# Patient Record
Sex: FEMALE | Race: BLACK OR AFRICAN AMERICAN | NOT HISPANIC OR LATINO | Employment: FULL TIME | URBAN - METROPOLITAN AREA
[De-identification: names, ages, dates, MRNs, and addresses within clinical notes are randomized per-mention and may not be internally consistent; named-entity substitution may affect disease eponyms.]

---

## 2020-02-03 ENCOUNTER — OCCUPATIONAL HEALTH (OUTPATIENT)
Dept: URGENT CARE | Facility: CLINIC | Age: 25
End: 2020-02-03
Payer: COMMERCIAL

## 2020-02-03 DIAGNOSIS — Z02.89 ENCOUNTER FOR PHYSICAL EXAMINATION RELATED TO EMPLOYMENT: ICD-10-CM

## 2020-02-03 PROCEDURE — 80305 DRUG TEST PRSMV DIR OPT OBS: CPT | Mod: S$GLB,,, | Performed by: EMERGENCY MEDICINE

## 2020-02-03 PROCEDURE — 80305 PR COLLECTION ONLY DRUG SCREEN: ICD-10-PCS | Mod: S$GLB,,, | Performed by: EMERGENCY MEDICINE

## 2020-02-16 ENCOUNTER — HOSPITAL ENCOUNTER (EMERGENCY)
Facility: HOSPITAL | Age: 25
Discharge: HOME | End: 2020-02-16
Attending: EMERGENCY MEDICINE
Payer: COMMERCIAL

## 2020-02-16 VITALS
BODY MASS INDEX: 18.78 KG/M2 | DIASTOLIC BLOOD PRESSURE: 96 MMHG | TEMPERATURE: 99 F | HEART RATE: 98 BPM | HEIGHT: 64 IN | OXYGEN SATURATION: 99 % | RESPIRATION RATE: 20 BRPM | WEIGHT: 110 LBS | SYSTOLIC BLOOD PRESSURE: 132 MMHG

## 2020-02-16 DIAGNOSIS — R52 PAIN: ICD-10-CM

## 2020-02-16 DIAGNOSIS — S92.351A DISPLACED FRACTURE OF FIFTH METATARSAL BONE, RIGHT FOOT, INITIAL ENCOUNTER FOR CLOSED FRACTURE: Primary | ICD-10-CM

## 2020-02-16 LAB — B-HCG UR QL: NEGATIVE

## 2020-02-16 PROCEDURE — 73630 X-RAY EXAM OF FOOT: CPT | Mod: TC,FY,LT

## 2020-02-16 PROCEDURE — 63600175 PHARM REV CODE 636 W HCPCS: Performed by: PHYSICIAN ASSISTANT

## 2020-02-16 PROCEDURE — 73630 X-RAY EXAM OF FOOT: CPT | Mod: 26,LT,, | Performed by: RADIOLOGY

## 2020-02-16 PROCEDURE — 96372 THER/PROPH/DIAG INJ SC/IM: CPT

## 2020-02-16 PROCEDURE — 73630 XR FOOT COMPLETE 3 VIEW LEFT: ICD-10-PCS | Mod: 26,LT,, | Performed by: RADIOLOGY

## 2020-02-16 PROCEDURE — 99283 EMERGENCY DEPT VISIT LOW MDM: CPT | Mod: 25

## 2020-02-16 PROCEDURE — 81025 URINE PREGNANCY TEST: CPT

## 2020-02-16 PROCEDURE — 25000003 PHARM REV CODE 250: Performed by: PHYSICIAN ASSISTANT

## 2020-02-16 RX ORDER — ACETAMINOPHEN 500 MG
500 TABLET ORAL
Status: COMPLETED | OUTPATIENT
Start: 2020-02-16 | End: 2020-02-16

## 2020-02-16 RX ORDER — KETOROLAC TROMETHAMINE 30 MG/ML
15 INJECTION, SOLUTION INTRAMUSCULAR; INTRAVENOUS
Status: COMPLETED | OUTPATIENT
Start: 2020-02-16 | End: 2020-02-16

## 2020-02-16 RX ORDER — TRAMADOL HYDROCHLORIDE 50 MG/1
50 TABLET ORAL EVERY 6 HOURS PRN
Qty: 12 TABLET | Refills: 0 | Status: ON HOLD | OUTPATIENT
Start: 2020-02-16 | End: 2020-02-21 | Stop reason: HOSPADM

## 2020-02-16 RX ADMIN — KETOROLAC TROMETHAMINE 15 MG: 30 INJECTION, SOLUTION INTRAMUSCULAR; INTRAVENOUS at 08:02

## 2020-02-16 RX ADMIN — ACETAMINOPHEN 500 MG: 500 TABLET, FILM COATED ORAL at 08:02

## 2020-02-17 NOTE — ED PROVIDER NOTES
Encounter Date: 2/16/2020       History     Chief Complaint   Patient presents with    Fall     Patient fell coming down the stairs and injured her left foot.     Patient presents to the ER with complaint of left foot pain.  Patient states fell while going down stairs.  Patient states unable to bear weight secondary to pain in left foot.  Patient denies numbness or tingling to the distal extremity.  Patient denies prior injury to this foot describes the pain as severe sharp nonradiating nothing makes it better touching or moving it makes it worse denies having taking any medicine prior to arrival for pain.  Patient denies having icy injury prior to arrival.  Patient denied other associated injuries or symptoms.    The history is provided by the patient.     Review of patient's allergies indicates:  No Known Allergies  History reviewed. No pertinent past medical history.  History reviewed. No pertinent surgical history.  History reviewed. No pertinent family history.  Social History     Tobacco Use    Smoking status: Never Smoker   Substance Use Topics    Alcohol use: Never     Frequency: Never    Drug use: Never     Review of Systems   Constitutional: Negative for fever.   HENT: Negative for sore throat.    Respiratory: Negative for shortness of breath.    Cardiovascular: Negative for chest pain.   Gastrointestinal: Negative for abdominal pain, constipation, diarrhea, nausea and vomiting.   Genitourinary: Negative for dysuria.   Musculoskeletal: Positive for arthralgias (Left foot) and gait problem ( unable to bear weight due to pain on left foot). Negative for back pain, neck pain and neck stiffness.   Skin: Negative for rash.   Neurological: Negative for weakness.   Hematological: Does not bruise/bleed easily.   All other systems reviewed and are negative.      Physical Exam     Initial Vitals [02/16/20 2015]   BP Pulse Resp Temp SpO2   (!) 132/96 98 20 98.6 °F (37 °C) 99 %      MAP       --         Physical  Exam    Nursing note and vitals reviewed.  Constitutional: She appears well-developed and well-nourished. She is not diaphoretic. No distress.   HENT:   Head: Atraumatic.   Eyes: Right eye exhibits no discharge. Left eye exhibits no discharge.   Neck: Normal range of motion. Neck supple.   Cardiovascular: Normal rate, regular rhythm and intact distal pulses.   Pulmonary/Chest: No respiratory distress.   Musculoskeletal: Normal range of motion. She exhibits tenderness. She exhibits no edema.        Right ankle: Normal.        Left ankle: Normal.        Right foot: Normal.        Left foot: There is tenderness, bony tenderness, swelling and deformity. There is normal range of motion, normal capillary refill and no laceration.        Feet:    Neurological: She is alert and oriented to person, place, and time. No sensory deficit. GCS score is 15. GCS eye subscore is 4. GCS verbal subscore is 5. GCS motor subscore is 6.   Skin: Skin is warm and dry. Capillary refill takes less than 2 seconds.   Psychiatric: She has a normal mood and affect. Thought content normal.         ED Course   Procedures  Labs Reviewed   PREGNANCY TEST, URINE RAPID          Imaging Results          X-Ray Foot Complete Left (In process)               X-Rays:   Independently Interpreted Readings:   Other Readings:  Comminuted fracture to 5th metatarsal    Medical Decision Making:   Differential Diagnosis:   Sprain strain contusion fracture dislocation  Clinical Tests:   Radiological Study: Ordered and Reviewed  ED Management:  Discussed with the patient plan of care will order imaging and medicine for pain patient verbalize she understood she did not have any questions.    Discussed imaging results as well as need for follow-up with Podiatry discussed prescribed medication wrist benefit discussed over-the-counter treatment of pain with Tylenol ibuprofen as well as return to ER precautions discussed with the patient do not bear weight on left foot  until released by Podiatry the patient verbalize she understood she did not have any questions.    Splint applied by RN with direct supervision by me patient neurovascular intact post splint application.    This note was created using [employer] voice recognition software that occasionally misinterpreted phrases or words.                                 Clinical Impression:       ICD-10-CM ICD-9-CM   1. Displaced fracture of fifth metatarsal bone, right foot, initial encounter for closed fracture S92.351A 825.25   2. Pain R52 780.96                             REN Figueroa  02/16/20 2133

## 2020-02-17 NOTE — DISCHARGE INSTRUCTIONS
DO NOT BEAR WEIGHT ON LEFT FOOT UNTIL CLEARED BY PODIATRIST.    Your blood pressure was elevated on exam today please follow up with pcp for blood pressure management.     Follow-up with Dr. Nobles at next available appointment.    If acute worsening of symptoms return to ER for re-evaluation.    May take over-the-counter Tylenol and/or ibuprofen with prescribed medicine.  Use prescribed medicine for breakthrough pain only.

## 2020-02-19 ENCOUNTER — LAB VISIT (OUTPATIENT)
Dept: LAB | Facility: HOSPITAL | Age: 25
End: 2020-02-19
Attending: PODIATRIST
Payer: COMMERCIAL

## 2020-02-19 ENCOUNTER — OFFICE VISIT (OUTPATIENT)
Dept: PODIATRY | Facility: CLINIC | Age: 25
End: 2020-02-19
Payer: COMMERCIAL

## 2020-02-19 VITALS
HEART RATE: 86 BPM | WEIGHT: 110 LBS | SYSTOLIC BLOOD PRESSURE: 124 MMHG | RESPIRATION RATE: 18 BRPM | DIASTOLIC BLOOD PRESSURE: 79 MMHG | HEIGHT: 64 IN | BODY MASS INDEX: 18.78 KG/M2 | TEMPERATURE: 98 F

## 2020-02-19 DIAGNOSIS — Z01.818 PRE-OP EXAM: ICD-10-CM

## 2020-02-19 DIAGNOSIS — S92.352A CLOSED DISPLACED FRACTURE OF FIFTH METATARSAL BONE OF LEFT FOOT, INITIAL ENCOUNTER: Primary | ICD-10-CM

## 2020-02-19 DIAGNOSIS — S92.351A DISPLACED FRACTURE OF FIFTH METATARSAL BONE, RIGHT FOOT, INITIAL ENCOUNTER FOR CLOSED FRACTURE: ICD-10-CM

## 2020-02-19 LAB
ALBUMIN SERPL BCP-MCNC: 4 G/DL (ref 3.5–5.2)
ALP SERPL-CCNC: 41 U/L (ref 55–135)
ALT SERPL W/O P-5'-P-CCNC: 42 U/L (ref 10–44)
ANION GAP SERPL CALC-SCNC: 8 MMOL/L (ref 8–16)
AST SERPL-CCNC: 31 U/L (ref 10–40)
BASOPHILS # BLD AUTO: 0.06 K/UL (ref 0–0.2)
BASOPHILS NFR BLD: 1.3 % (ref 0–1.9)
BILIRUB SERPL-MCNC: 0.8 MG/DL (ref 0.1–1)
BUN SERPL-MCNC: 8 MG/DL (ref 6–20)
CALCIUM SERPL-MCNC: 8.7 MG/DL (ref 8.7–10.5)
CHLORIDE SERPL-SCNC: 107 MMOL/L (ref 95–110)
CO2 SERPL-SCNC: 25 MMOL/L (ref 23–29)
CREAT SERPL-MCNC: 0.8 MG/DL (ref 0.5–1.4)
DIFFERENTIAL METHOD: NORMAL
EOSINOPHIL # BLD AUTO: 0.3 K/UL (ref 0–0.5)
EOSINOPHIL NFR BLD: 7 % (ref 0–8)
ERYTHROCYTE [DISTWIDTH] IN BLOOD BY AUTOMATED COUNT: 11.9 % (ref 11.5–14.5)
EST. GFR  (AFRICAN AMERICAN): >60 ML/MIN/1.73 M^2
EST. GFR  (NON AFRICAN AMERICAN): >60 ML/MIN/1.73 M^2
GLUCOSE SERPL-MCNC: 91 MG/DL (ref 70–110)
HCT VFR BLD AUTO: 40.9 % (ref 37–48.5)
HGB BLD-MCNC: 13.4 G/DL (ref 12–16)
IMM GRANULOCYTES # BLD AUTO: 0.02 K/UL (ref 0–0.04)
IMM GRANULOCYTES NFR BLD AUTO: 0.4 % (ref 0–0.5)
LYMPHOCYTES # BLD AUTO: 1.7 K/UL (ref 1–4.8)
LYMPHOCYTES NFR BLD: 36.4 % (ref 18–48)
MCH RBC QN AUTO: 28.2 PG (ref 27–31)
MCHC RBC AUTO-ENTMCNC: 32.8 G/DL (ref 32–36)
MCV RBC AUTO: 86 FL (ref 82–98)
MONOCYTES # BLD AUTO: 0.4 K/UL (ref 0.3–1)
MONOCYTES NFR BLD: 8.7 % (ref 4–15)
NEUTROPHILS # BLD AUTO: 2.2 K/UL (ref 1.8–7.7)
NEUTROPHILS NFR BLD: 46.2 % (ref 38–73)
NRBC BLD-RTO: 0 /100 WBC
PLATELET # BLD AUTO: 270 K/UL (ref 150–350)
PMV BLD AUTO: 10.6 FL (ref 9.2–12.9)
POTASSIUM SERPL-SCNC: 3.9 MMOL/L (ref 3.5–5.1)
PROT SERPL-MCNC: 6.7 G/DL (ref 6–8.4)
RBC # BLD AUTO: 4.76 M/UL (ref 4–5.4)
SODIUM SERPL-SCNC: 140 MMOL/L (ref 136–145)
WBC # BLD AUTO: 4.73 K/UL (ref 3.9–12.7)

## 2020-02-19 PROCEDURE — 3008F BODY MASS INDEX DOCD: CPT | Mod: CPTII,S$GLB,, | Performed by: PODIATRIST

## 2020-02-19 PROCEDURE — 85025 COMPLETE CBC W/AUTO DIFF WBC: CPT

## 2020-02-19 PROCEDURE — 36415 COLL VENOUS BLD VENIPUNCTURE: CPT

## 2020-02-19 PROCEDURE — 99205 OFFICE O/P NEW HI 60 MIN: CPT | Mod: 57,S$GLB,, | Performed by: PODIATRIST

## 2020-02-19 PROCEDURE — 3008F PR BODY MASS INDEX (BMI) DOCUMENTED: ICD-10-PCS | Mod: CPTII,S$GLB,, | Performed by: PODIATRIST

## 2020-02-19 PROCEDURE — 99999 PR PBB SHADOW E&M-EST. PATIENT-LVL V: CPT | Mod: PBBFAC,,, | Performed by: PODIATRIST

## 2020-02-19 PROCEDURE — 99999 PR PBB SHADOW E&M-EST. PATIENT-LVL V: ICD-10-PCS | Mod: PBBFAC,,, | Performed by: PODIATRIST

## 2020-02-19 PROCEDURE — 80053 COMPREHEN METABOLIC PANEL: CPT

## 2020-02-19 PROCEDURE — 99205 PR OFFICE/OUTPT VISIT, NEW, LEVL V, 60-74 MIN: ICD-10-PCS | Mod: 57,S$GLB,, | Performed by: PODIATRIST

## 2020-02-19 RX ORDER — SODIUM CHLORIDE, SODIUM LACTATE, POTASSIUM CHLORIDE, CALCIUM CHLORIDE 600; 310; 30; 20 MG/100ML; MG/100ML; MG/100ML; MG/100ML
INJECTION, SOLUTION INTRAVENOUS CONTINUOUS
Status: CANCELLED | OUTPATIENT
Start: 2020-02-21

## 2020-02-19 RX ORDER — OXYCODONE AND ACETAMINOPHEN 5; 325 MG/1; MG/1
2 TABLET ORAL
Qty: 84 TABLET | Refills: 0 | Status: ON HOLD | OUTPATIENT
Start: 2020-02-19 | End: 2020-02-21 | Stop reason: SDUPTHER

## 2020-02-19 RX ORDER — FLUCONAZOLE 200 MG/1
200 TABLET ORAL
Qty: 4 TABLET | Refills: 1 | Status: ON HOLD | OUTPATIENT
Start: 2020-02-19 | End: 2020-02-21

## 2020-02-19 RX ORDER — METRONIDAZOLE 500 MG/1
TABLET ORAL
Status: ON HOLD | COMMUNITY
Start: 2020-02-07 | End: 2020-02-21

## 2020-02-19 RX ORDER — SULFAMETHOXAZOLE AND TRIMETHOPRIM 400; 80 MG/1; MG/1
2 TABLET ORAL 2 TIMES DAILY
Qty: 56 TABLET | Refills: 0 | Status: SHIPPED | OUTPATIENT
Start: 2020-02-19 | End: 2020-03-04

## 2020-02-19 RX ORDER — ONDANSETRON HYDROCHLORIDE 8 MG/1
8 TABLET, FILM COATED ORAL EVERY 8 HOURS PRN
Qty: 42 TABLET | Refills: 1 | Status: SHIPPED | OUTPATIENT
Start: 2020-02-19 | End: 2020-03-04

## 2020-02-19 NOTE — H&P (VIEW-ONLY)
Subjective:       Patient ID: Yaritza Lopez is a 24 y.o. female.    Chief Complaint: Foot Injury    Patient presents today for new patient evaluation she states that this past Sunday she fell down the stairs injuring the outside of her left foot she subsequently was seen in the emergency room and told that she had a fracture and was referred to me for evaluation and treatment.  Patient states she has not put any weight at all on her left foot she has worn a fracture boot and used crutches she states she cannot put any weight on the foot because of the discomfort she is having.  History reviewed. No pertinent past medical history.  History reviewed. No pertinent surgical history.  History reviewed. No pertinent family history.  Social History     Socioeconomic History    Marital status: Single     Spouse name: Not on file    Number of children: Not on file    Years of education: Not on file    Highest education level: Not on file   Occupational History    Not on file   Social Needs    Financial resource strain: Not on file    Food insecurity:     Worry: Not on file     Inability: Not on file    Transportation needs:     Medical: Not on file     Non-medical: Not on file   Tobacco Use    Smoking status: Never Smoker   Substance and Sexual Activity    Alcohol use: Never     Frequency: Never    Drug use: Never    Sexual activity: Not on file   Lifestyle    Physical activity:     Days per week: Not on file     Minutes per session: Not on file    Stress: Not on file   Relationships    Social connections:     Talks on phone: Not on file     Gets together: Not on file     Attends [employer] service: Not on file     Active member of club or organization: Not on file     Attends meetings of clubs or organizations: Not on file     Relationship status: Not on file   Other Topics Concern    Not on file   Social History Narrative    Not on file       Current Outpatient Medications   Medication Sig Dispense  "Refill    fluconazole (DIFLUCAN) 200 MG Tab Take 1 tablet (200 mg total) by mouth every 72 hours. for 14 days 4 tablet 1    metroNIDAZOLE (FLAGYL) 500 MG tablet       ondansetron (ZOFRAN) 8 MG tablet Take 1 tablet (8 mg total) by mouth every 8 (eight) hours as needed for Nausea. 42 tablet 1    oxyCODONE-acetaminophen (PERCOCET) 5-325 mg per tablet Take 2 tablets by mouth 6 (six) times daily. for 7 days 84 tablet 0    sulfamethoxazole-trimethoprim 400-80mg (BACTRIM,SEPTRA) 400-80 mg per tablet Take 2 tablets by mouth 2 (two) times daily. for 14 days 56 tablet 0    traMADol (ULTRAM) 50 mg tablet Take 1 tablet (50 mg total) by mouth every 6 (six) hours as needed. 12 tablet 0     No current facility-administered medications for this visit.      Review of patient's allergies indicates:  No Known Allergies    Review of Systems   Musculoskeletal: Positive for gait problem and joint swelling.   Skin: Positive for color change.   All other systems reviewed and are negative.      Objective:      Vitals:    02/19/20 0808   BP: 124/79   BP Location: Right arm   Patient Position: Sitting   Pulse: 86   Resp: 18   Temp: 98.2 °F (36.8 °C)   TempSrc: Oral   Weight: 49.9 kg (110 lb)   Height: 5' 4" (1.626 m)     Physical Exam   Constitutional: She appears well-developed and well-nourished.   HENT:   Head: Normocephalic.   Cardiovascular: Normal rate, regular rhythm and normal heart sounds.   Pulses:       Dorsalis pedis pulses are 2+ on the right side, and 2+ on the left side.        Posterior tibial pulses are 2+ on the right side, and 2+ on the left side.   Pulmonary/Chest: Effort normal and breath sounds normal.   Musculoskeletal: She exhibits tenderness and deformity.        Left ankle: She exhibits decreased range of motion, swelling, ecchymosis and deformity.        Left foot: There is decreased range of motion.        Feet:    Feet:   Right Foot:   Protective Sensation: 4 sites tested. 4 sites sensed.   Left Foot: "   Protective Sensation: 4 sites tested. 4 sites sensed.   Skin Integrity: Positive for erythema and warmth.   Neurological: She is alert.   Skin: Skin is warm. Capillary refill takes less than 2 seconds. There is erythema.   Psychiatric: She has a normal mood and affect. Her speech is normal and behavior is normal. Judgment and thought content normal. Cognition and memory are normal.   Nursing note and vitals reviewed.          Comprehensive metabolic panel   Order: [id number]   Status:  Final result   Visible to patient:  No (Not Released) Next appt:  02/24/2020 at 08:45 AM in Podiatry (Tim Nobles DPM) Dx:  Pre-op exam; Displaced fracture of fi...    Ref Range & Units 09:09   Sodium 136 - 145 mmol/L 140    Potassium 3.5 - 5.1 mmol/L 3.9    Chloride 95 - 110 mmol/L 107    CO2 23 - 29 mmol/L 25    Glucose 70 - 110 mg/dL 91    BUN, Bld 6 - 20 mg/dL 8    Creatinine 0.5 - 1.4 mg/dL 0.8    Calcium 8.7 - 10.5 mg/dL 8.7    Total Protein 6.0 - 8.4 g/dL 6.7    Albumin 3.5 - 5.2 g/dL 4.0    Total Bilirubin 0.1 - 1.0 mg/dL 0.8    Comment: For infants and newborns, interpretation of results should be based   on gestational age, weight and in agreement with clinical   observations.   Premature Infant recommended reference ranges:   Up to 24 hours.............<8.0 mg/dL   Up to 48 hours............<12.0 mg/dL   3-5 days..................<15.0 mg/dL   6-29 days.................<15.0 mg/dL    Alkaline Phosphatase 55 - 135 U/L 41Low     AST 10 - 40 U/L 31    ALT 10 - 44 U/L 42    Anion Gap 8 - 16 mmol/L 8    eGFR if African American >60 mL/min/1.73 m^2 >60.0    eGFR if non African American >60 mL/min/1.73 m^2 >60.0    Comment: Calculation used to obtain the estimated glomerular filtration   rate (eGFR) is the CKD-EPI equation.    [facility]         Specimen Collected: 02/19/20 09:09 Last Resulted: 02/19/20 09:59           CBC auto differential   Order: [id number]   Status:  Final result   Visible to patient:   No (Not Released)   Next appt:  02/24/2020 at 08:45 AM in Podiatry (Tim Nobles DPM)   Dx:  Pre-op exam; Displaced fracture of fi...    Ref Range & Units 09:09   WBC 3.90 - 12.70 K/uL 4.73    RBC 4.00 - 5.40 M/uL 4.76    Hemoglobin 12.0 - 16.0 g/dL 13.4    Hematocrit 37.0 - 48.5 % 40.9    Mean Corpuscular Volume 82 - 98 fL 86    Mean Corpuscular Hemoglobin 27.0 - 31.0 pg 28.2    Mean Corpuscular Hemoglobin Conc 32.0 - 36.0 g/dL 32.8    RDW 11.5 - 14.5 % 11.9    Platelets 150 - 350 K/uL 270    MPV 9.2 - 12.9 fL 10.6    Immature Granulocytes 0.0 - 0.5 % 0.4    Gran # (ANC) 1.8 - 7.7 K/uL 2.2    Immature Grans (Abs) 0.00 - 0.04 K/uL 0.02    Comment: Mild elevation in immature granulocytes is non specific and   can be seen in a variety of conditions including stress response,   acute inflammation, trauma and pregnancy. Correlation with other   laboratory and clinical findings is essential.    Lymph # 1.0 - 4.8 K/uL 1.7    Mono # 0.3 - 1.0 K/uL 0.4    Eos # 0.0 - 0.5 K/uL 0.3    Baso # 0.00 - 0.20 K/uL 0.06    nRBC 0 /100 WBC 0    Gran% 38.0 - 73.0 % 46.2    Lymph% 18.0 - 48.0 % 36.4    Mono% 4.0 - 15.0 % 8.7    Eosinophil% 0.0 - 8.0 % 7.0    Basophil% 0.0 - 1.9 % 1.3    Differential Method  Automated    [facility]         Specimen Collected: 02/19/20 09:09 Last Resulted: 02/19/20 09:42                Assessment:       1. Closed displaced fracture of fifth metatarsal bone of left foot, initial encounter    2. Pre-op exam        Plan:       Patient presents today for new patient evaluation she states that this past Sunday she fell down the stairs injuring the outside of her left foot she subsequently was seen in the emergency room and told that she had a fracture and was referred to me for evaluation and treatment.  Patient states she has not put any weight at all on her left foot she has worn a fracture boot and used crutches she states she cannot put any weight on the foot because of the  discomfort she is having.  On evaluation patient has significant erythema edema with ecchymosis encompassing the dorsal and lateral aspects of the patient's left foot there is significant tenderness with associated edema present secondary to a mid shaft fracture just proximal to the neck of the 5th metatarsal left foot there is displacement noted with gapping noted at the fracture site.  I did review the x-rays with the patient in detail and at length I have advised the patient I am recommending surgical repair I have advised her there is a possibility without repair that this may not heal or if it does heal it will heal in a malaligned position causing her long-term problems she was in understanding and agreement with this today and agreed to surgical intervention as discussed.  Patient presents today for preoperative history and physical in discussion all aspects of surgery were discussed with the patient no guarantees were given written or implied all potential complications including but not limited to delayed healing nonhealing postoperative pain infection recurrence were discussed with the patient in detail. All aspect of the patient's recovery time involved in recovery patient responsibility is involving recovery were also discussed in detail. Patient advised failure to comply with postoperative care will jeopardize surgical outcome.  All aspects of surgical intervention were discussed at length and in detail patient understands she is going to be completely nonweightbearing for the next 4 weeks followed by several weeks of partial weight-bearing in a fracture boot before gradual return to activity.  I did discuss fixation as necessary screws plates what ever needs to be utilized properly fixated and correct the fracture site patient was in understanding and agreement with this.  A complete new patient evaluation was performed as was surgical consultation patient may decision for surgery today separate  evaluation and management performed prescriptions were dispensed for Percocet Zofran Bactrim and Diflucan because the patient does have a history of yeast infections with oral antibiotic use.  Preoperative lab work has been ordered and already evaluated I did dispense the patient a prescription for a knee walker to help maintain her nonweightbearing status she can use her crutches as necessary and I gave her a prescription for a new fracture boot the 1 she is currently wearing is not going to be protective enough following surgery.  Patient will be NPO after midnight on February 20th patient's surgery is scheduled for February 21st she has been advised to contact us with any problems questions or concerns prior to surgery I have advised the patient she needs to ice and elevate this as much as possible to minimize the swelling prior to surgery certainly she will maintain complete not when nonweightbearing status.  Total face-to-face time including discussion evaluation new patient exam review of x-rays discussion of surgery and decision for surgery today equaled 60 min.This note was created using [employer] voice recognition software that occasionally misinterpreted phrases or words.

## 2020-02-19 NOTE — PROGRESS NOTES
Subjective:       Patient ID: Yaritza Lopez is a 24 y.o. female.    Chief Complaint: Foot Injury    Patient presents today for new patient evaluation she states that this past Sunday she fell down the stairs injuring the outside of her left foot she subsequently was seen in the emergency room and told that she had a fracture and was referred to me for evaluation and treatment.  Patient states she has not put any weight at all on her left foot she has worn a fracture boot and used crutches she states she cannot put any weight on the foot because of the discomfort she is having.  History reviewed. No pertinent past medical history.  History reviewed. No pertinent surgical history.  History reviewed. No pertinent family history.  Social History     Socioeconomic History    Marital status: Single     Spouse name: Not on file    Number of children: Not on file    Years of education: Not on file    Highest education level: Not on file   Occupational History    Not on file   Social Needs    Financial resource strain: Not on file    Food insecurity:     Worry: Not on file     Inability: Not on file    Transportation needs:     Medical: Not on file     Non-medical: Not on file   Tobacco Use    Smoking status: Never Smoker   Substance and Sexual Activity    Alcohol use: Never     Frequency: Never    Drug use: Never    Sexual activity: Not on file   Lifestyle    Physical activity:     Days per week: Not on file     Minutes per session: Not on file    Stress: Not on file   Relationships    Social connections:     Talks on phone: Not on file     Gets together: Not on file     Attends Jewish service: Not on file     Active member of club or organization: Not on file     Attends meetings of clubs or organizations: Not on file     Relationship status: Not on file   Other Topics Concern    Not on file   Social History Narrative    Not on file       Current Outpatient Medications   Medication Sig Dispense  "Refill    fluconazole (DIFLUCAN) 200 MG Tab Take 1 tablet (200 mg total) by mouth every 72 hours. for 14 days 4 tablet 1    metroNIDAZOLE (FLAGYL) 500 MG tablet       ondansetron (ZOFRAN) 8 MG tablet Take 1 tablet (8 mg total) by mouth every 8 (eight) hours as needed for Nausea. 42 tablet 1    oxyCODONE-acetaminophen (PERCOCET) 5-325 mg per tablet Take 2 tablets by mouth 6 (six) times daily. for 7 days 84 tablet 0    sulfamethoxazole-trimethoprim 400-80mg (BACTRIM,SEPTRA) 400-80 mg per tablet Take 2 tablets by mouth 2 (two) times daily. for 14 days 56 tablet 0    traMADol (ULTRAM) 50 mg tablet Take 1 tablet (50 mg total) by mouth every 6 (six) hours as needed. 12 tablet 0     No current facility-administered medications for this visit.      Review of patient's allergies indicates:  No Known Allergies    Review of Systems   Musculoskeletal: Positive for gait problem and joint swelling.   Skin: Positive for color change.   All other systems reviewed and are negative.      Objective:      Vitals:    02/19/20 0808   BP: 124/79   BP Location: Right arm   Patient Position: Sitting   Pulse: 86   Resp: 18   Temp: 98.2 °F (36.8 °C)   TempSrc: Oral   Weight: 49.9 kg (110 lb)   Height: 5' 4" (1.626 m)     Physical Exam   Constitutional: She appears well-developed and well-nourished.   HENT:   Head: Normocephalic.   Cardiovascular: Normal rate, regular rhythm and normal heart sounds.   Pulses:       Dorsalis pedis pulses are 2+ on the right side, and 2+ on the left side.        Posterior tibial pulses are 2+ on the right side, and 2+ on the left side.   Pulmonary/Chest: Effort normal and breath sounds normal.   Musculoskeletal: She exhibits tenderness and deformity.        Left ankle: She exhibits decreased range of motion, swelling, ecchymosis and deformity.        Left foot: There is decreased range of motion.        Feet:    Feet:   Right Foot:   Protective Sensation: 4 sites tested. 4 sites sensed.   Left Foot: "   Protective Sensation: 4 sites tested. 4 sites sensed.   Skin Integrity: Positive for erythema and warmth.   Neurological: She is alert.   Skin: Skin is warm. Capillary refill takes less than 2 seconds. There is erythema.   Psychiatric: She has a normal mood and affect. Her speech is normal and behavior is normal. Judgment and thought content normal. Cognition and memory are normal.   Nursing note and vitals reviewed.          Comprehensive metabolic panel   Order: [id number]   Status:  Final result   Visible to patient:  No (Not Released) Next appt:  02/24/2020 at 08:45 AM in Podiatry (Tim Nobles DPM) Dx:  Pre-op exam; Displaced fracture of fi...    Ref Range & Units 09:09   Sodium 136 - 145 mmol/L 140    Potassium 3.5 - 5.1 mmol/L 3.9    Chloride 95 - 110 mmol/L 107    CO2 23 - 29 mmol/L 25    Glucose 70 - 110 mg/dL 91    BUN, Bld 6 - 20 mg/dL 8    Creatinine 0.5 - 1.4 mg/dL 0.8    Calcium 8.7 - 10.5 mg/dL 8.7    Total Protein 6.0 - 8.4 g/dL 6.7    Albumin 3.5 - 5.2 g/dL 4.0    Total Bilirubin 0.1 - 1.0 mg/dL 0.8    Comment: For infants and newborns, interpretation of results should be based   on gestational age, weight and in agreement with clinical   observations.   Premature Infant recommended reference ranges:   Up to 24 hours.............<8.0 mg/dL   Up to 48 hours............<12.0 mg/dL   3-5 days..................<15.0 mg/dL   6-29 days.................<15.0 mg/dL    Alkaline Phosphatase 55 - 135 U/L 41Low     AST 10 - 40 U/L 31    ALT 10 - 44 U/L 42    Anion Gap 8 - 16 mmol/L 8    eGFR if African American >60 mL/min/1.73 m^2 >60.0    eGFR if non African American >60 mL/min/1.73 m^2 >60.0    Comment: Calculation used to obtain the estimated glomerular filtration   rate (eGFR) is the CKD-EPI equation.    [facility]         Specimen Collected: 02/19/20 09:09 Last Resulted: 02/19/20 09:59           CBC auto differential   Order: [id number]   Status:  Final result   Visible to patient:   No (Not Released)   Next appt:  02/24/2020 at 08:45 AM in Podiatry (Tim Nobles DPM)   Dx:  Pre-op exam; Displaced fracture of fi...    Ref Range & Units 09:09   WBC 3.90 - 12.70 K/uL 4.73    RBC 4.00 - 5.40 M/uL 4.76    Hemoglobin 12.0 - 16.0 g/dL 13.4    Hematocrit 37.0 - 48.5 % 40.9    Mean Corpuscular Volume 82 - 98 fL 86    Mean Corpuscular Hemoglobin 27.0 - 31.0 pg 28.2    Mean Corpuscular Hemoglobin Conc 32.0 - 36.0 g/dL 32.8    RDW 11.5 - 14.5 % 11.9    Platelets 150 - 350 K/uL 270    MPV 9.2 - 12.9 fL 10.6    Immature Granulocytes 0.0 - 0.5 % 0.4    Gran # (ANC) 1.8 - 7.7 K/uL 2.2    Immature Grans (Abs) 0.00 - 0.04 K/uL 0.02    Comment: Mild elevation in immature granulocytes is non specific and   can be seen in a variety of conditions including stress response,   acute inflammation, trauma and pregnancy. Correlation with other   laboratory and clinical findings is essential.    Lymph # 1.0 - 4.8 K/uL 1.7    Mono # 0.3 - 1.0 K/uL 0.4    Eos # 0.0 - 0.5 K/uL 0.3    Baso # 0.00 - 0.20 K/uL 0.06    nRBC 0 /100 WBC 0    Gran% 38.0 - 73.0 % 46.2    Lymph% 18.0 - 48.0 % 36.4    Mono% 4.0 - 15.0 % 8.7    Eosinophil% 0.0 - 8.0 % 7.0    Basophil% 0.0 - 1.9 % 1.3    Differential Method  Automated    [facility]         Specimen Collected: 02/19/20 09:09 Last Resulted: 02/19/20 09:42                Assessment:       1. Closed displaced fracture of fifth metatarsal bone of left foot, initial encounter    2. Pre-op exam        Plan:       Patient presents today for new patient evaluation she states that this past Sunday she fell down the stairs injuring the outside of her left foot she subsequently was seen in the emergency room and told that she had a fracture and was referred to me for evaluation and treatment.  Patient states she has not put any weight at all on her left foot she has worn a fracture boot and used crutches she states she cannot put any weight on the foot because of the  discomfort she is having.  On evaluation patient has significant erythema edema with ecchymosis encompassing the dorsal and lateral aspects of the patient's left foot there is significant tenderness with associated edema present secondary to a mid shaft fracture just proximal to the neck of the 5th metatarsal left foot there is displacement noted with gapping noted at the fracture site.  I did review the x-rays with the patient in detail and at length I have advised the patient I am recommending surgical repair I have advised her there is a possibility without repair that this may not heal or if it does heal it will heal in a malaligned position causing her long-term problems she was in understanding and agreement with this today and agreed to surgical intervention as discussed.  Patient presents today for preoperative history and physical in discussion all aspects of surgery were discussed with the patient no guarantees were given written or implied all potential complications including but not limited to delayed healing nonhealing postoperative pain infection recurrence were discussed with the patient in detail. All aspect of the patient's recovery time involved in recovery patient responsibility is involving recovery were also discussed in detail. Patient advised failure to comply with postoperative care will jeopardize surgical outcome.  All aspects of surgical intervention were discussed at length and in detail patient understands she is going to be completely nonweightbearing for the next 4 weeks followed by several weeks of partial weight-bearing in a fracture boot before gradual return to activity.  I did discuss fixation as necessary screws plates what ever needs to be utilized properly fixated and correct the fracture site patient was in understanding and agreement with this.  A complete new patient evaluation was performed as was surgical consultation patient may decision for surgery today separate  evaluation and management performed prescriptions were dispensed for Percocet Zofran Bactrim and Diflucan because the patient does have a history of yeast infections with oral antibiotic use.  Preoperative lab work has been ordered and already evaluated I did dispense the patient a prescription for a knee walker to help maintain her nonweightbearing status she can use her crutches as necessary and I gave her a prescription for a new fracture boot the 1 she is currently wearing is not going to be protective enough following surgery.  Patient will be NPO after midnight on February 20th patient's surgery is scheduled for February 21st she has been advised to contact us with any problems questions or concerns prior to surgery I have advised the patient she needs to ice and elevate this as much as possible to minimize the swelling prior to surgery certainly she will maintain complete not when nonweightbearing status.  Total face-to-face time including discussion evaluation new patient exam review of x-rays discussion of surgery and decision for surgery today equaled 60 min.This note was created using [employer] voice recognition software that occasionally misinterpreted phrases or words.

## 2020-02-21 ENCOUNTER — TELEPHONE (OUTPATIENT)
Dept: PODIATRY | Facility: CLINIC | Age: 25
End: 2020-02-21

## 2020-02-21 ENCOUNTER — ANESTHESIA (OUTPATIENT)
Dept: SURGERY | Facility: HOSPITAL | Age: 25
End: 2020-02-21
Payer: COMMERCIAL

## 2020-02-21 ENCOUNTER — HOSPITAL ENCOUNTER (OUTPATIENT)
Facility: HOSPITAL | Age: 25
Discharge: HOME | End: 2020-02-21
Attending: PODIATRIST | Admitting: PODIATRIST
Payer: COMMERCIAL

## 2020-02-21 ENCOUNTER — ANESTHESIA EVENT (OUTPATIENT)
Dept: SURGERY | Facility: HOSPITAL | Age: 25
End: 2020-02-21
Payer: COMMERCIAL

## 2020-02-21 DIAGNOSIS — S92.351A DISPLACED FRACTURE OF FIFTH METATARSAL BONE, RIGHT FOOT, INITIAL ENCOUNTER FOR CLOSED FRACTURE: Primary | ICD-10-CM

## 2020-02-21 DIAGNOSIS — Z01.818 PRE-OP EXAM: ICD-10-CM

## 2020-02-21 DIAGNOSIS — S92.352A CLOSED DISPLACED FRACTURE OF FIFTH METATARSAL BONE OF LEFT FOOT, INITIAL ENCOUNTER: ICD-10-CM

## 2020-02-21 LAB — B-HCG UR QL: NEGATIVE

## 2020-02-21 PROCEDURE — S0020 INJECTION, BUPIVICAINE HYDRO: HCPCS | Performed by: PODIATRIST

## 2020-02-21 PROCEDURE — 63600175 PHARM REV CODE 636 W HCPCS: Performed by: PODIATRIST

## 2020-02-21 PROCEDURE — D9220A PRA ANESTHESIA: Mod: CRNA,,, | Performed by: NURSE ANESTHETIST, CERTIFIED REGISTERED

## 2020-02-21 PROCEDURE — 36000709 HC OR TIME LEV III EA ADD 15 MIN: Performed by: PODIATRIST

## 2020-02-21 PROCEDURE — 28485 PR OPEN TREATMENT METATARSAL FRACTURE EACH: ICD-10-PCS | Mod: T4,,, | Performed by: PODIATRIST

## 2020-02-21 PROCEDURE — 63600175 PHARM REV CODE 636 W HCPCS

## 2020-02-21 PROCEDURE — 25000003 PHARM REV CODE 250: Performed by: PODIATRIST

## 2020-02-21 PROCEDURE — 28485 OPTX METATARSAL FX EACH: CPT | Mod: T4,,, | Performed by: PODIATRIST

## 2020-02-21 PROCEDURE — 63600175 PHARM REV CODE 636 W HCPCS: Performed by: NURSE ANESTHETIST, CERTIFIED REGISTERED

## 2020-02-21 PROCEDURE — 71000015 HC POSTOP RECOV 1ST HR: Performed by: PODIATRIST

## 2020-02-21 PROCEDURE — 36000708 HC OR TIME LEV III 1ST 15 MIN: Performed by: PODIATRIST

## 2020-02-21 PROCEDURE — D9220A PRA ANESTHESIA: ICD-10-PCS | Mod: CRNA,,, | Performed by: NURSE ANESTHETIST, CERTIFIED REGISTERED

## 2020-02-21 PROCEDURE — 71000033 HC RECOVERY, INTIAL HOUR: Performed by: PODIATRIST

## 2020-02-21 PROCEDURE — 37000008 HC ANESTHESIA 1ST 15 MINUTES: Performed by: PODIATRIST

## 2020-02-21 PROCEDURE — D9220A PRA ANESTHESIA: ICD-10-PCS | Mod: ANES,,, | Performed by: ANESTHESIOLOGY

## 2020-02-21 PROCEDURE — C1713 ANCHOR/SCREW BN/BN,TIS/BN: HCPCS | Performed by: PODIATRIST

## 2020-02-21 PROCEDURE — 37000009 HC ANESTHESIA EA ADD 15 MINS: Performed by: PODIATRIST

## 2020-02-21 PROCEDURE — C1769 GUIDE WIRE: HCPCS | Performed by: PODIATRIST

## 2020-02-21 PROCEDURE — 81025 URINE PREGNANCY TEST: CPT

## 2020-02-21 PROCEDURE — D9220A PRA ANESTHESIA: Mod: ANES,,, | Performed by: ANESTHESIOLOGY

## 2020-02-21 DEVICE — IMPLANTABLE DEVICE: Type: IMPLANTABLE DEVICE | Site: FOOT | Status: FUNCTIONAL

## 2020-02-21 RX ORDER — MIDAZOLAM HYDROCHLORIDE 1 MG/ML
2 INJECTION INTRAMUSCULAR; INTRAVENOUS ONCE
Status: DISCONTINUED | OUTPATIENT
Start: 2020-02-21 | End: 2020-02-21 | Stop reason: SDUPTHER

## 2020-02-21 RX ORDER — LIDOCAINE HYDROCHLORIDE 10 MG/ML
INJECTION, SOLUTION EPIDURAL; INFILTRATION; INTRACAUDAL; PERINEURAL
Status: DISCONTINUED
Start: 2020-02-21 | End: 2020-02-21 | Stop reason: HOSPADM

## 2020-02-21 RX ORDER — MIDAZOLAM HYDROCHLORIDE 1 MG/ML
INJECTION INTRAMUSCULAR; INTRAVENOUS
Status: COMPLETED
Start: 2020-02-21 | End: 2020-02-21

## 2020-02-21 RX ORDER — LIDOCAINE HYDROCHLORIDE 10 MG/ML
1 INJECTION, SOLUTION EPIDURAL; INFILTRATION; INTRACAUDAL; PERINEURAL ONCE
Status: CANCELLED | OUTPATIENT
Start: 2020-02-21 | End: 2020-02-21

## 2020-02-21 RX ORDER — CEFAZOLIN SODIUM 1 G/50ML
1 SOLUTION INTRAVENOUS
Status: COMPLETED | OUTPATIENT
Start: 2020-02-21 | End: 2020-02-21

## 2020-02-21 RX ORDER — OXYCODONE AND ACETAMINOPHEN 5; 325 MG/1; MG/1
1 TABLET ORAL
Status: DISCONTINUED | OUTPATIENT
Start: 2020-02-21 | End: 2020-02-21 | Stop reason: HOSPADM

## 2020-02-21 RX ORDER — BUPIVACAINE HYDROCHLORIDE 5 MG/ML
INJECTION, SOLUTION EPIDURAL; INTRACAUDAL
Status: DISCONTINUED | OUTPATIENT
Start: 2020-02-21 | End: 2020-02-21 | Stop reason: HOSPADM

## 2020-02-21 RX ORDER — ONDANSETRON 2 MG/ML
INJECTION INTRAMUSCULAR; INTRAVENOUS
Status: DISCONTINUED | OUTPATIENT
Start: 2020-02-21 | End: 2020-02-21

## 2020-02-21 RX ORDER — SODIUM CHLORIDE, SODIUM LACTATE, POTASSIUM CHLORIDE, CALCIUM CHLORIDE 600; 310; 30; 20 MG/100ML; MG/100ML; MG/100ML; MG/100ML
125 INJECTION, SOLUTION INTRAVENOUS CONTINUOUS
Status: DISCONTINUED | OUTPATIENT
Start: 2020-02-21 | End: 2020-02-21 | Stop reason: HOSPADM

## 2020-02-21 RX ORDER — KETOROLAC TROMETHAMINE 30 MG/ML
15 INJECTION, SOLUTION INTRAMUSCULAR; INTRAVENOUS ONCE
Status: DISCONTINUED | OUTPATIENT
Start: 2020-02-21 | End: 2020-02-21 | Stop reason: HOSPADM

## 2020-02-21 RX ORDER — MORPHINE SULFATE 4 MG/ML
2 INJECTION, SOLUTION INTRAMUSCULAR; INTRAVENOUS EVERY 5 MIN PRN
Status: DISCONTINUED | OUTPATIENT
Start: 2020-02-21 | End: 2020-02-21 | Stop reason: HOSPADM

## 2020-02-21 RX ORDER — ONDANSETRON 2 MG/ML
4 INJECTION INTRAMUSCULAR; INTRAVENOUS DAILY PRN
Status: DISCONTINUED | OUTPATIENT
Start: 2020-02-21 | End: 2020-02-21 | Stop reason: HOSPADM

## 2020-02-21 RX ORDER — LIDOCAINE HYDROCHLORIDE 10 MG/ML
INJECTION INFILTRATION; PERINEURAL
Status: DISCONTINUED | OUTPATIENT
Start: 2020-02-21 | End: 2020-02-21 | Stop reason: HOSPADM

## 2020-02-21 RX ORDER — SODIUM CHLORIDE, SODIUM LACTATE, POTASSIUM CHLORIDE, CALCIUM CHLORIDE 600; 310; 30; 20 MG/100ML; MG/100ML; MG/100ML; MG/100ML
INJECTION, SOLUTION INTRAVENOUS CONTINUOUS
Status: CANCELLED | OUTPATIENT
Start: 2020-02-21

## 2020-02-21 RX ORDER — SODIUM CHLORIDE, SODIUM LACTATE, POTASSIUM CHLORIDE, CALCIUM CHLORIDE 600; 310; 30; 20 MG/100ML; MG/100ML; MG/100ML; MG/100ML
INJECTION, SOLUTION INTRAVENOUS CONTINUOUS
Status: DISCONTINUED | OUTPATIENT
Start: 2020-02-21 | End: 2020-02-21 | Stop reason: HOSPADM

## 2020-02-21 RX ORDER — MIDAZOLAM HYDROCHLORIDE 1 MG/ML
2 INJECTION INTRAMUSCULAR; INTRAVENOUS ONCE
Status: DISCONTINUED | OUTPATIENT
Start: 2020-02-21 | End: 2020-02-21 | Stop reason: HOSPADM

## 2020-02-21 RX ORDER — PROPOFOL 10 MG/ML
VIAL (ML) INTRAVENOUS
Status: DISCONTINUED | OUTPATIENT
Start: 2020-02-21 | End: 2020-02-21

## 2020-02-21 RX ORDER — OXYCODONE AND ACETAMINOPHEN 5; 325 MG/1; MG/1
2 TABLET ORAL
Qty: 60 TABLET | Refills: 0 | Status: SHIPPED | OUTPATIENT
Start: 2020-02-21 | End: 2020-02-26

## 2020-02-21 RX ADMIN — ONDANSETRON 4 MG: 2 INJECTION INTRAMUSCULAR; INTRAVENOUS at 10:02

## 2020-02-21 RX ADMIN — CEFAZOLIN SODIUM 1 G: 1 SOLUTION INTRAVENOUS at 09:02

## 2020-02-21 RX ADMIN — PROPOFOL 50 MG: 10 INJECTION, EMULSION INTRAVENOUS at 09:02

## 2020-02-21 RX ADMIN — MIDAZOLAM HYDROCHLORIDE 2 MG: 1 INJECTION, SOLUTION INTRAMUSCULAR; INTRAVENOUS at 08:02

## 2020-02-21 RX ADMIN — PROPOFOL 150 MG: 10 INJECTION, EMULSION INTRAVENOUS at 09:02

## 2020-02-21 RX ADMIN — SODIUM CHLORIDE, SODIUM LACTATE, POTASSIUM CHLORIDE, AND CALCIUM CHLORIDE: .6; .31; .03; .02 INJECTION, SOLUTION INTRAVENOUS at 08:02

## 2020-02-21 NOTE — INTERVAL H&P NOTE
The patient has been examined and the H&P has been reviewed:    I concur with the findings and no changes have occurred since H&P was written.    Anesthesia/Surgery risks, benefits and alternative options discussed and understood by patient/family.          Active Hospital Problems    Diagnosis  POA    Pre-op exam [Z01.818]  Not Applicable      Resolved Hospital Problems   No resolved problems to display.

## 2020-02-21 NOTE — ANESTHESIA PREPROCEDURE EVALUATION
02/21/2020  Yaritza Lopez is a 24 y.o., female.    Pre-op Assessment    I have reviewed the Patient Summary Reports.    I have reviewed the Nursing Notes.   I have reviewed the Medications.     Review of Systems  Anesthesia Hx:  No problems with previous Anesthesia  Neg history of prior surgery. Denies Family Hx of Anesthesia complications.   Denies Personal Hx of Anesthesia complications.   Social:  Non-Smoker    Hematology/Oncology:  Hematology Normal   Oncology Normal     EENT/Dental:EENT/Dental Normal   Cardiovascular:  Cardiovascular Normal     Pulmonary:  Pulmonary Normal    Renal/:  Renal/ Normal     Hepatic/GI:  Hepatic/GI Normal    Musculoskeletal:  Musculoskeletal Normal    Neurological:  Neurology Normal    Endocrine:  Endocrine Normal    Dermatological:  Skin Normal    Psych:  Psychiatric Normal           Physical Exam  General:  Well nourished    Airway/Jaw/Neck:  AIRWAY FINDINGS: Normal      Eyes/Ears/Nose:  EYES/EARS/NOSE FINDINGS: Normal   Dental:  DENTAL FINDINGS: Normal   Chest/Lungs:  Chest/Lungs Clear    Heart/Vascular:  Heart Findings: Normal Heart murmur: negative Vascular Findings: Normal    Abdomen:  Abdomen Findings: Normal    Musculoskeletal:  Musculoskeletal Findings: Normal   Skin:  Skin Findings: Normal    Mental Status:  Mental Status Findings: Normal        Anesthesia Plan  Type of Anesthesia, risks & benefits discussed:  Anesthesia Type:  general  Patient's Preference:   Intra-op Monitoring Plan: standard ASA monitors  Intra-op Monitoring Plan Comments:   Post Op Pain Control Plan:   Post Op Pain Control Plan Comments:   Induction:   IV  Beta Blocker:  Patient is not currently on a Beta-Blocker (No further documentation required).       Informed Consent: Patient understands risks and agrees with Anesthesia plan.  Questions answered. Anesthesia consent signed with  patient.  ASA Score: 1     Day of Surgery Review of History & Physical:    H&P update referred to the provider.

## 2020-02-21 NOTE — PLAN OF CARE
To bathroom per w/c. Clothing placed in bathroom. Call light cord placed in reach. Instructed on use. V/u. Mother at pts side for assistance.

## 2020-02-21 NOTE — TELEPHONE ENCOUNTER
Patient's mom is requesting percocet rx to be sent to [facility] in [place], [address]. She didn't realize her meds were sent to [facility] in [place] until after they got home from surgery. All other prescriptions were transferred but could not transfer percocet.

## 2020-02-21 NOTE — TELEPHONE ENCOUNTER
----- Message from Evi Ramirez sent at 2/21/2020  1:13 PM CST -----  Contact: pt mother Tyron [phone]  Patient 's mother Sharita called and asked if you will call in her pain medication and antibiotics to be called into [facility] in [facility] [address] the phone is [phone] mother is asking for a call back to confirm.

## 2020-02-21 NOTE — OP NOTE
[facility] - Periop Services  Operative Note     SUMMARY     Surgery Date: 2/21/2020       Pre-op Diagnosis:  Displaced fracture of fifth metatarsal bone, left foot, initial encounter for closed fracture   Pre-op exam [Z01.818]    Post-op Diagnosis:     * Pre-op exam [Z01.818]  Displaced fracture of fifth metatarsal bone, left foot, initial encounter for closed fracture   Procedure(s) (LRB):  ORIF, FRACTURE, METATARSAL BONE  [place] 28 screws and baby gorilla (Left)  Procedure code 07517 5th metatarsal left  Surgeon(s) and Role:     * Tim Nobles, CESILIAM - Primary      Estimated Blood Loss:  Minimal  Hemostasis:  250 mg of mercury ankle tourniquet left 54 min  Injectables 30 cc of 0.5% Marcaine plain  Anesthesia:  LMA in conjunction with local infiltrate equaling 20 cc of 1% lidocaine plain  Materials 3.0 Vicryl 4.0 Monocryl [place] 28 6 hole straight baby grill a plate            [place] 28 locking screws 2.5 x 12 mm 2.5 x 10 mm x 3 nonlocking plate 2.5 x 8 mm 2.5 x 10 mm  Description of the findings of the procedure:  Displaced fracture of fifth metatarsal bone, left foot, initial encounter for closed fracture   Pre-op exam [Z01.818]         Specimens (From admission, onward)    None           Procedure:  Patient was taken to the operating room placed in a supine position on the OR table attention was then directed towards the patient's left ankle where Webril cast padding was placed on the patient's left ankle as was an ankle tourniquet.  Patient was locally anesthetized in a regional block of the 5th ray of the left foot utilizing a total of 20 cc of 1% lidocaine plain the area was then prepped and draped in the usual sterile manner.  Patient's foot was exsanguinated utilizing an Esmarch bandage and the ankle tourniquet was raised to 250 mm of mercury following this intraoperative fluoroscopy was used to plan the incision overlying the 5th ray of the patient's left foot a longitudinal  linear incision was created immediately upon making the incision there was hematoma encountered directly overlying the area of multiple fractures at the neck and shaft of the 5th metatarsal left foot. Hematoma was evacuated from the area fragments were removed from the area fracture this allowed for reduction and realignment of the 5th metatarsal there were multiple fragmented fractures of a greenstick type fracture in nature at the neck and mid shaft of the 5th metatarsal once properly realigned as confirmed under intraoperative fluoroscopy the area was temporarily fixated while a [place] 28 6 hole straight baby girl real a plate was placed over the area maintaining proper alignment and reduction of the deformity a total of 6 screws were placed to maintain proper alignment fixation and placed compression on the fracture site in proper alignment.  The area was flushed irrigated with copious amounts of sterile saline deep closure was achieved with 3.0 Vicryl in a continuous running fashion as was intermediate closure achieved with 3.0 Vicryl in a simple interrupted fashion skin closure was achieved with 4.0 Monocryl in a subcuticular fashion Mastisol and Steri-Strips were applied patient was injected with an additional 30 cc of 0.5% Marcaine plain to create a long-term postoperative anesthetic ankle tourniquet was lowered no significant bleeding noted Adaptic nonadhesive dressing sterile 4 x 4 ABD Kerlix were used to dress the area patient was then placed in a lower leg fiberglass cast prior to application of the cast the patient's vascular status had return to normal preoperative levels.  Patient will maintain complete nonweightbearing status for the next 4 weeks.  Intraoperative fluoroscopy was used throughout the procedure.This note was created using [employer] voice recognition software that occasionally misinterpreted phrases or words.

## 2020-02-21 NOTE — BRIEF OP NOTE
[facility] - Periop Services  Brief Operative Note    Surgery Date: 2/21/2020     Surgeon(s) and Role:     * Tim Nobles DPM - Primary    Assisting Surgeon: None    Pre-op Diagnosis:  Displaced fracture of fifth metatarsal bone, left foot, initial encounter for closed fracture   Pre-op exam [Z01.818]    Post-op Diagnosis:  Post-Op Diagnosis Codes:     * Displaced fracture of fifth metatarsal bone, left foot, initial encounter for closed fracture      * Pre-op exam [Z01.818]    Procedure(s) (LRB):  ORIF, FRACTURE, METATARSAL BONE  [place] 28 screws and baby gorilla (Left)    Anesthesia: Choice    Description of the findings of the procedure(s):     Estimated Blood Loss: * No values recorded between 2/21/2020  9:28 AM and 2/21/2020 10:36 AM *         Specimens:   Specimen (12h ago, onward)    None            Discharge Note    OUTCOME: Patient tolerated treatment/procedure well without complication and is now ready for discharge.    DISPOSITION: Home or Self Care    FINAL DIAGNOSIS:  <principal problem not specified>    FOLLOWUP: In clinic    DISCHARGE INSTRUCTIONS:    Discharge Procedure Orders   Keep surgical extremity elevated     Ice to affected area     Lifting restrictions     Weight bearing restrictions (specify):

## 2020-02-24 ENCOUNTER — OFFICE VISIT (OUTPATIENT)
Dept: PODIATRY | Facility: CLINIC | Age: 25
End: 2020-02-24
Payer: COMMERCIAL

## 2020-02-24 VITALS
DIASTOLIC BLOOD PRESSURE: 77 MMHG | SYSTOLIC BLOOD PRESSURE: 116 MMHG | HEART RATE: 79 BPM | BODY MASS INDEX: 18.78 KG/M2 | TEMPERATURE: 98 F | RESPIRATION RATE: 18 BRPM | OXYGEN SATURATION: 98 % | WEIGHT: 110 LBS | HEIGHT: 64 IN

## 2020-02-24 DIAGNOSIS — S92.352G CLOSED DISPLACED FRACTURE OF FIFTH METATARSAL BONE OF LEFT FOOT WITH DELAYED HEALING, SUBSEQUENT ENCOUNTER: Primary | ICD-10-CM

## 2020-02-24 PROCEDURE — 99999 PR PBB SHADOW E&M-EST. PATIENT-LVL III: ICD-10-PCS | Mod: PBBFAC,,, | Performed by: PODIATRIST

## 2020-02-24 PROCEDURE — 99024 PR POST-OP FOLLOW-UP VISIT: ICD-10-PCS | Mod: S$GLB,,, | Performed by: PODIATRIST

## 2020-02-24 PROCEDURE — 99024 POSTOP FOLLOW-UP VISIT: CPT | Mod: S$GLB,,, | Performed by: PODIATRIST

## 2020-02-24 PROCEDURE — 99999 PR PBB SHADOW E&M-EST. PATIENT-LVL III: CPT | Mod: PBBFAC,,, | Performed by: PODIATRIST

## 2020-02-25 NOTE — PROGRESS NOTES
"Yaritza Lopez is a 24 y.o. female patient.   1. Closed displaced fracture of fifth metatarsal bone of left foot with delayed healing, subsequent encounter      Past Medical History:   Diagnosis Date    Asthma      No past surgical history pertinent negatives on file.  Scheduled Meds:  Continuous Infusions:  PRN Meds:    Review of patient's allergies indicates:  No Known Allergies  There are no hospital problems to display for this patient.    Blood pressure 116/77, pulse 79, temperature 98.3 °F (36.8 °C), temperature source Oral, resp. rate 18, height 5' 4" (1.626 m), weight 49.9 kg (110 lb), last menstrual period 02/24/2020, SpO2 98 %.    Subjective  Objective   Assessment & Plan    patient presents status post ORIF fracture 5th metatarsal left.  Patient is currently afebrile and in no acute distress she states that she really has not had much of an appetite and thinks it may be the antibiotics patient is currently taking Bactrim I have advised her it is more likely the Percocet that is causing her to feel somewhat nauseous not hungry she is going to discontinue the Percocet and only take it is absolutely necessary she states her pain has substantially decreased.  Patient is taking her Bactrim and her aspirin as prescribed she is going to start taking her Diflucan today.  Patient states she did take a small fall putting pressure on the left foot but states she really did not have increased pain.  I plan to follow up with the patient in 2 weeks for x-rays in a cast change she has been advised to contact us with any problems questions or concerns I have recommended continued ice and elevation to control her pain patient has not gone back to work yet I have advised her I am okay with her going back to work but obviously she needs to maintain her nonweightbearing status.  Patient's cast is fitting well both distally and proximally capillary fill time is immediate distal digits left.This note was created using " [employer] voice recognition software that occasionally misinterpreted phrases or words.    Tim Nobles DPM  2/25/2020

## 2020-02-27 VITALS
DIASTOLIC BLOOD PRESSURE: 73 MMHG | SYSTOLIC BLOOD PRESSURE: 112 MMHG | OXYGEN SATURATION: 98 % | TEMPERATURE: 98 F | RESPIRATION RATE: 21 BRPM | HEART RATE: 75 BPM | HEIGHT: 64 IN | WEIGHT: 110 LBS | BODY MASS INDEX: 18.78 KG/M2

## 2020-03-03 PROBLEM — S92.352G CLOSED DISPLACED FRACTURE OF FIFTH METATARSAL BONE OF LEFT FOOT WITH DELAYED HEALING: Status: ACTIVE | Noted: 2020-03-03

## 2020-03-09 ENCOUNTER — OFFICE VISIT (OUTPATIENT)
Dept: PODIATRY | Facility: CLINIC | Age: 25
End: 2020-03-09
Payer: COMMERCIAL

## 2020-03-09 ENCOUNTER — HOSPITAL ENCOUNTER (OUTPATIENT)
Dept: RADIOLOGY | Facility: HOSPITAL | Age: 25
Discharge: HOME | End: 2020-03-09
Attending: PODIATRIST
Payer: COMMERCIAL

## 2020-03-09 VITALS
BODY MASS INDEX: 18.78 KG/M2 | SYSTOLIC BLOOD PRESSURE: 123 MMHG | WEIGHT: 110 LBS | DIASTOLIC BLOOD PRESSURE: 78 MMHG | TEMPERATURE: 98 F | HEIGHT: 64 IN | HEART RATE: 101 BPM

## 2020-03-09 DIAGNOSIS — S92.352G CLOSED DISPLACED FRACTURE OF FIFTH METATARSAL BONE OF LEFT FOOT WITH DELAYED HEALING: ICD-10-CM

## 2020-03-09 DIAGNOSIS — S92.352G CLOSED DISPLACED FRACTURE OF FIFTH METATARSAL BONE OF LEFT FOOT WITH DELAYED HEALING: Primary | ICD-10-CM

## 2020-03-09 PROBLEM — S92.351A DISPLACED FRACTURE OF FIFTH METATARSAL BONE, RIGHT FOOT, INITIAL ENCOUNTER FOR CLOSED FRACTURE: Status: RESOLVED | Noted: 2020-02-19 | Resolved: 2020-03-09

## 2020-03-09 PROCEDURE — 73630 X-RAY EXAM OF FOOT: CPT | Mod: 26,LT,, | Performed by: RADIOLOGY

## 2020-03-09 PROCEDURE — 73630 XR FOOT COMPLETE 3 VIEW LEFT: ICD-10-PCS | Mod: 26,LT,, | Performed by: RADIOLOGY

## 2020-03-09 PROCEDURE — 99999 PR PBB SHADOW E&M-EST. PATIENT-LVL III: ICD-10-PCS | Mod: PBBFAC,,, | Performed by: PODIATRIST

## 2020-03-09 PROCEDURE — 99024 POSTOP FOLLOW-UP VISIT: CPT | Mod: S$GLB,,, | Performed by: PODIATRIST

## 2020-03-09 PROCEDURE — 29405 APPL SHORT LEG CAST: CPT | Mod: 58,LT,S$GLB, | Performed by: PODIATRIST

## 2020-03-09 PROCEDURE — 99024 PR POST-OP FOLLOW-UP VISIT: ICD-10-PCS | Mod: S$GLB,,, | Performed by: PODIATRIST

## 2020-03-09 PROCEDURE — 73630 X-RAY EXAM OF FOOT: CPT | Mod: TC,FY,LT

## 2020-03-09 PROCEDURE — 99999 PR PBB SHADOW E&M-EST. PATIENT-LVL III: CPT | Mod: PBBFAC,,, | Performed by: PODIATRIST

## 2020-03-09 PROCEDURE — 29405 PR APPLY SHORT LEG CAST: ICD-10-PCS | Mod: 58,LT,S$GLB, | Performed by: PODIATRIST

## 2020-03-09 RX ORDER — MELOXICAM 15 MG/1
15 TABLET ORAL DAILY
Qty: 30 TABLET | Refills: 2 | Status: SHIPPED | OUTPATIENT
Start: 2020-03-09 | End: 2020-04-06

## 2020-03-09 RX ORDER — SULFAMETHOXAZOLE AND TRIMETHOPRIM 800; 160 MG/1; MG/1
1 TABLET ORAL
COMMUNITY
End: 2020-04-06

## 2020-03-09 RX ORDER — FLUCONAZOLE 50 MG/1
TABLET ORAL DAILY
COMMUNITY
End: 2020-04-06

## 2020-03-14 NOTE — PROGRESS NOTES
"Yaritza Lopez is a 24 y.o. female patient.   1. Closed displaced fracture of fifth metatarsal bone of left foot with delayed healing      Past Medical History:   Diagnosis Date    Asthma      No past surgical history pertinent negatives on file.  Scheduled Meds:  Continuous Infusions:  PRN Meds:            Review of patient's allergies indicates:  No Known Allergies  There are no hospital problems to display for this patient.    Blood pressure 123/78, pulse 101, temperature 98.4 °F (36.9 °C), height 5' 4" (1.626 m), weight 49.9 kg (110 lb), last menstrual period 02/24/2020.    Subjective  Objective:  Vital signs (most recent): Blood pressure 123/78, pulse 101, temperature 98.4 °F (36.9 °C), height 5' 4" (1.626 m), weight 49.9 kg (110 lb), last menstrual period 02/24/2020.     Assessment & Plan    patient presents status post ORIF fracture 5th metatarsal left.  Patient is doing well she is not having any significant pain or discomfort at this time she states she has not finished her antibiotics yet but will finish the she is currently afebrile and in no acute distress.  Patient's cast was removed the incision looks very good she has mild inflammation consistent with current postoperative status overlying the 5th metatarsal region left.  X-rays reveal good anatomic alignment and healing at the previously noted fracture site with plate and screw fixation in this region.  New lower leg fiberglass cast was applied with a well-padded dressing I have started the patient on Mobic 15 mg a day to help with inflammation she is going to maintain her nonweightbearing status I plan to follow up with the patient in 2 weeks at which time I will allow her to go into her fracture boot and start to bear weight as tolerated patient has been advised to contact us with any problems questions or concerns she is currently taking daily aspirin as recommended to minimize the possibility of DVT.  This note was created using [employer] " voice recognition software that occasionally misinterpreted phrases or words.    Tim Nobles DPM  3/14/2020

## 2020-03-20 ENCOUNTER — TELEPHONE (OUTPATIENT)
Dept: PODIATRY | Facility: CLINIC | Age: 25
End: 2020-03-20

## 2020-03-23 ENCOUNTER — HOSPITAL ENCOUNTER (OUTPATIENT)
Dept: RADIOLOGY | Facility: HOSPITAL | Age: 25
Discharge: HOME | End: 2020-03-23
Attending: PODIATRIST
Payer: COMMERCIAL

## 2020-03-23 ENCOUNTER — OFFICE VISIT (OUTPATIENT)
Dept: PODIATRY | Facility: CLINIC | Age: 25
End: 2020-03-23
Payer: COMMERCIAL

## 2020-03-23 VITALS
WEIGHT: 110 LBS | BODY MASS INDEX: 18.78 KG/M2 | HEIGHT: 64 IN | HEART RATE: 108 BPM | DIASTOLIC BLOOD PRESSURE: 71 MMHG | SYSTOLIC BLOOD PRESSURE: 99 MMHG | TEMPERATURE: 99 F

## 2020-03-23 DIAGNOSIS — S92.352G CLOSED DISPLACED FRACTURE OF FIFTH METATARSAL BONE OF LEFT FOOT WITH DELAYED HEALING: ICD-10-CM

## 2020-03-23 DIAGNOSIS — S92.352G CLOSED DISPLACED FRACTURE OF FIFTH METATARSAL BONE OF LEFT FOOT WITH DELAYED HEALING: Primary | ICD-10-CM

## 2020-03-23 PROCEDURE — 73630 X-RAY EXAM OF FOOT: CPT | Mod: TC,FY,LT

## 2020-03-23 PROCEDURE — 29515 PR APPLY LOWER LEG SPLINT: ICD-10-PCS | Mod: 58,LT,S$GLB, | Performed by: PODIATRIST

## 2020-03-23 PROCEDURE — 99024 POSTOP FOLLOW-UP VISIT: CPT | Mod: S$GLB,,, | Performed by: PODIATRIST

## 2020-03-23 PROCEDURE — 73630 XR FOOT COMPLETE 3 VIEW LEFT: ICD-10-PCS | Mod: 26,LT,, | Performed by: RADIOLOGY

## 2020-03-23 PROCEDURE — 73630 X-RAY EXAM OF FOOT: CPT | Mod: 26,LT,, | Performed by: RADIOLOGY

## 2020-03-23 PROCEDURE — 99999 PR PBB SHADOW E&M-EST. PATIENT-LVL III: CPT | Mod: PBBFAC,,, | Performed by: PODIATRIST

## 2020-03-23 PROCEDURE — 29515 APPLICATION SHORT LEG SPLINT: CPT | Mod: 58,LT,S$GLB, | Performed by: PODIATRIST

## 2020-03-23 PROCEDURE — 99999 PR PBB SHADOW E&M-EST. PATIENT-LVL III: ICD-10-PCS | Mod: PBBFAC,,, | Performed by: PODIATRIST

## 2020-03-23 PROCEDURE — 99024 PR POST-OP FOLLOW-UP VISIT: ICD-10-PCS | Mod: S$GLB,,, | Performed by: PODIATRIST

## 2020-03-26 NOTE — PROGRESS NOTES
"Yaritza Lopez is a 24 y.o. female patient.   1. Closed displaced fracture of fifth metatarsal bone of left foot with delayed healing      Past Medical History:   Diagnosis Date    Asthma      No past surgical history pertinent negatives on file.  Scheduled Meds:  Continuous Infusions:  PRN Meds:                            Review of patient's allergies indicates:  No Known Allergies  There are no hospital problems to display for this patient.    Blood pressure 99/71, pulse 108, temperature 98.6 °F (37 °C), height 5' 4" (1.626 m), weight 49.9 kg (110 lb), last menstrual period 02/24/2020.    Subjective  Objective:  Vital signs (most recent): Blood pressure 99/71, pulse 108, temperature 98.6 °F (37 °C), height 5' 4" (1.626 m), weight 49.9 kg (110 lb), last menstrual period 02/24/2020.     Assessment & Plan    patient presents status post ORIF fracture 5th metatarsal left.  Patient is doing well she is not having any significant pain or discomfort at this time.  Patient is currently afebrile and in no acute distress..  Patient's cast was removed the incision looks very good she has mild inflammation consistent with current postoperative status overlying the 5th metatarsal region left.  X-rays reveal good anatomic alignment and healing at the previously noted fracture site with plate and screw fixation in this region.  Patient related today she did not get the boot she states she would could not afford to get the boot she is scheduled to go in a weight-bearing boot today however because she does not have the boot she is going to have to go back into a posterior splint and maintain nonweightbearing status she states that she will try to get the boot we put the patient a new posterior splint left side with a new dressing on the area I have advised her when she gets the boot she can bring it into the office will remove the splint and put her in the boot.  Patient was in understanding and agreement with this she " understands she cannot bear weight until she gets her boot she can start to get the area wet but must dried offer a well she is not to apply any cream lotion or antibiotic ointment to the area.  Scheduled follow-up will be 2 weeks.  Patient subsequently returned to the office about 4 hr later she had a boot she was removed from the splint and put in the boot to bear weight as discussed.  A new posterior splint was applied today prior to her leaving the office.  Patient was advised everything appears to be healing well the incision looks good with no signs of infection.  This note was created using [employer] voice recognition software that occasionally misinterpreted phrases or words.    Tim Nobles DPM  3/26/2020

## 2020-04-06 ENCOUNTER — HOSPITAL ENCOUNTER (OUTPATIENT)
Dept: RADIOLOGY | Facility: HOSPITAL | Age: 25
Discharge: HOME | End: 2020-04-06
Attending: PODIATRIST
Payer: COMMERCIAL

## 2020-04-06 ENCOUNTER — OFFICE VISIT (OUTPATIENT)
Dept: PODIATRY | Facility: CLINIC | Age: 25
End: 2020-04-06
Payer: COMMERCIAL

## 2020-04-06 VITALS
TEMPERATURE: 98 F | HEART RATE: 90 BPM | DIASTOLIC BLOOD PRESSURE: 69 MMHG | WEIGHT: 110 LBS | RESPIRATION RATE: 16 BRPM | HEIGHT: 64 IN | SYSTOLIC BLOOD PRESSURE: 105 MMHG | BODY MASS INDEX: 18.78 KG/M2 | OXYGEN SATURATION: 98 %

## 2020-04-06 DIAGNOSIS — S92.352G CLOSED DISPLACED FRACTURE OF FIFTH METATARSAL BONE OF LEFT FOOT WITH DELAYED HEALING: ICD-10-CM

## 2020-04-06 DIAGNOSIS — S92.352G CLOSED DISPLACED FRACTURE OF FIFTH METATARSAL BONE OF LEFT FOOT WITH DELAYED HEALING: Primary | ICD-10-CM

## 2020-04-06 PROCEDURE — 99999 PR PBB SHADOW E&M-EST. PATIENT-LVL IV: CPT | Mod: PBBFAC,,, | Performed by: PODIATRIST

## 2020-04-06 PROCEDURE — 73630 X-RAY EXAM OF FOOT: CPT | Mod: 26,LT,, | Performed by: RADIOLOGY

## 2020-04-06 PROCEDURE — 99999 PR PBB SHADOW E&M-EST. PATIENT-LVL IV: ICD-10-PCS | Mod: PBBFAC,,, | Performed by: PODIATRIST

## 2020-04-06 PROCEDURE — 73630 XR FOOT COMPLETE 3 VIEW LEFT: ICD-10-PCS | Mod: 26,LT,, | Performed by: RADIOLOGY

## 2020-04-06 PROCEDURE — 99024 PR POST-OP FOLLOW-UP VISIT: ICD-10-PCS | Mod: S$GLB,,, | Performed by: PODIATRIST

## 2020-04-06 PROCEDURE — 73630 X-RAY EXAM OF FOOT: CPT | Mod: TC,FY,LT

## 2020-04-06 PROCEDURE — 99024 POSTOP FOLLOW-UP VISIT: CPT | Mod: S$GLB,,, | Performed by: PODIATRIST

## 2020-04-06 NOTE — PROGRESS NOTES
"Yaritza Lopez is a 24 y.o. female patient.   1. Closed displaced fracture of fifth metatarsal bone of left foot with delayed healing      Past Medical History:   Diagnosis Date    Asthma      No past surgical history pertinent negatives on file.  Scheduled Meds:  Continuous Infusions:  PRN Meds:                                                Review of patient's allergies indicates:  No Known Allergies  There are no hospital problems to display for this patient.    Blood pressure 105/69, pulse 90, temperature 98.1 °F (36.7 °C), temperature source Oral, resp. rate 16, height 5' 4" (1.626 m), weight 49.9 kg (110 lb), SpO2 98 %.    Subjective  Objective:  Vital signs (most recent): Blood pressure 105/69, pulse 90, temperature 98.1 °F (36.7 °C), temperature source Oral, resp. rate 16, height 5' 4" (1.626 m), weight 49.9 kg (110 lb), SpO2 98 %.     Assessment & Plan    Patient presents status post ORIF fracture 5th metatarsal left.   Patient is doing well she states she is having some pain still she has been wearing the fracture boot she states the pain has been on the side and bottom of her left foot.  Patient is still using the crutches as she ambulates with the fracture boot.  Patient states she never got the prescription filled for the Mobic 15 mg daily as recommended I have advised the patient this was supposed to help her discomfort her inflammation her swelling she states that she will get it filled now.  Incision looks good it is well healed there is no drainage no active signs of infection patient has mild inflammation surrounding the area she is going to get the meloxicam as directed I have advised her not only does she need to start taking this I am going to allow her to transition out of the boot approximately 10 days from now I want her to continue to wear the boot in 10 days she can start to wear regular shoe as tolerated she is in understanding that she will likely have to wear the boot for part of " the day a regular shoe for part of the day until she is able to wear the shoe to shoe all day long.  Patient is not to apply any cream or lotion to the area and make sure that she cleans the area immediately after bathing and dries the area well.  Follow-up 3 weeks patient advised to contact us with any problems questions or concerns prior to this time. This note was created using [employer] voice recognition software that occasionally misinterpreted phrases or words.    Tim Nobles DPM  4/6/2020

## 2020-04-27 ENCOUNTER — HOSPITAL ENCOUNTER (OUTPATIENT)
Dept: RADIOLOGY | Facility: HOSPITAL | Age: 25
Discharge: HOME | End: 2020-04-27
Attending: PODIATRIST
Payer: COMMERCIAL

## 2020-04-27 ENCOUNTER — OFFICE VISIT (OUTPATIENT)
Dept: PODIATRY | Facility: CLINIC | Age: 25
End: 2020-04-27
Payer: COMMERCIAL

## 2020-04-27 VITALS
TEMPERATURE: 98 F | HEIGHT: 64 IN | SYSTOLIC BLOOD PRESSURE: 110 MMHG | DIASTOLIC BLOOD PRESSURE: 75 MMHG | HEART RATE: 81 BPM | BODY MASS INDEX: 18.78 KG/M2 | WEIGHT: 110 LBS

## 2020-04-27 DIAGNOSIS — S92.352G CLOSED DISPLACED FRACTURE OF FIFTH METATARSAL BONE OF LEFT FOOT WITH DELAYED HEALING: Primary | ICD-10-CM

## 2020-04-27 DIAGNOSIS — S92.352G CLOSED DISPLACED FRACTURE OF FIFTH METATARSAL BONE OF LEFT FOOT WITH DELAYED HEALING: ICD-10-CM

## 2020-04-27 PROCEDURE — 99999 PR PBB SHADOW E&M-EST. PATIENT-LVL III: CPT | Mod: PBBFAC,,, | Performed by: PODIATRIST

## 2020-04-27 PROCEDURE — 99024 PR POST-OP FOLLOW-UP VISIT: ICD-10-PCS | Mod: S$GLB,,, | Performed by: PODIATRIST

## 2020-04-27 PROCEDURE — 73630 X-RAY EXAM OF FOOT: CPT | Mod: TC,FY,LT

## 2020-04-27 PROCEDURE — 73630 XR FOOT COMPLETE 3 VIEW LEFT: ICD-10-PCS | Mod: 26,LT,, | Performed by: RADIOLOGY

## 2020-04-27 PROCEDURE — 73630 X-RAY EXAM OF FOOT: CPT | Mod: 26,LT,, | Performed by: RADIOLOGY

## 2020-04-27 PROCEDURE — 99024 POSTOP FOLLOW-UP VISIT: CPT | Mod: S$GLB,,, | Performed by: PODIATRIST

## 2020-04-27 PROCEDURE — 99999 PR PBB SHADOW E&M-EST. PATIENT-LVL III: ICD-10-PCS | Mod: PBBFAC,,, | Performed by: PODIATRIST

## 2020-04-27 RX ORDER — MELOXICAM 15 MG/1
TABLET ORAL
COMMUNITY
Start: 2020-04-06 | End: 2020-04-27 | Stop reason: SDUPTHER

## 2020-04-27 RX ORDER — MELOXICAM 15 MG/1
15 TABLET ORAL DAILY
Qty: 30 TABLET | Refills: 1 | Status: SHIPPED | OUTPATIENT
Start: 2020-04-27 | End: 2020-05-27

## 2020-04-30 NOTE — PROGRESS NOTES
"Yaritza Lopez is a 24 y.o. female patient.   1. Closed displaced fracture of fifth metatarsal bone of left foot with delayed healing      Past Medical History:   Diagnosis Date    Asthma      No past surgical history pertinent negatives on file.  Scheduled Meds:  Continuous Infusions:  PRN Meds:                                                    Review of patient's allergies indicates:  No Known Allergies  There are no hospital problems to display for this patient.    Blood pressure 110/75, pulse 81, temperature 97.9 °F (36.6 °C), height 5' 4" (1.626 m), weight 49.9 kg (110 lb).    Subjective  Objective:  Vital signs (most recent): Blood pressure 110/75, pulse 81, temperature 97.9 °F (36.6 °C), height 5' 4" (1.626 m), weight 49.9 kg (110 lb).     Assessment & Plan    Patient presents status post ORIF fracture 5th metatarsal left.   Patient states she has been doing well ambulating with the fracture boot she is having some ankle pain which is consistent with her current postoperative status in her immobilization I am going to allow the patient to start to transition into a normal shoe out of the boot she understands she is going to have increased swelling discomfort as she does this and increased stiffness I have advised the patient she can return to activity as tolerated I have recommended continued ice and elevation as needed patient was given a refill on her Mobic I have advised her she needs to take the 15 mg of Mobic daily at least for the next 30 days while she wrist is returning to normal activity patient was in understanding and agreement with this.  X-rays reviewed the area appears completely healed fixation as previously noted.  Plan follow-up will be as needed patient advised to contact us with any problems questions or concerns.  This note was created using [employer] voice recognition software that occasionally misinterpreted phrases or words.    Tim Nobles DPM  4/30/2020  "

## 2020-11-13 ENCOUNTER — HOSPITAL ENCOUNTER (EMERGENCY)
Facility: HOSPITAL | Age: 25
Discharge: HOME | End: 2020-11-13
Attending: EMERGENCY MEDICINE
Payer: COMMERCIAL

## 2020-11-13 VITALS
OXYGEN SATURATION: 100 % | BODY MASS INDEX: 18.78 KG/M2 | SYSTOLIC BLOOD PRESSURE: 117 MMHG | HEART RATE: 88 BPM | DIASTOLIC BLOOD PRESSURE: 79 MMHG | RESPIRATION RATE: 18 BRPM | TEMPERATURE: 99 F | WEIGHT: 110 LBS | HEIGHT: 64 IN

## 2020-11-13 DIAGNOSIS — S05.01XA ABRASION OF RIGHT CORNEA, INITIAL ENCOUNTER: Primary | ICD-10-CM

## 2020-11-13 LAB — B-HCG UR QL: NEGATIVE

## 2020-11-13 PROCEDURE — 25000003 PHARM REV CODE 250: Performed by: EMERGENCY MEDICINE

## 2020-11-13 PROCEDURE — 81025 URINE PREGNANCY TEST: CPT

## 2020-11-13 PROCEDURE — 99284 EMERGENCY DEPT VISIT MOD MDM: CPT

## 2020-11-13 RX ORDER — TETRACAINE HYDROCHLORIDE 5 MG/ML
2 SOLUTION OPHTHALMIC
Status: COMPLETED | OUTPATIENT
Start: 2020-11-13 | End: 2020-11-13

## 2020-11-13 RX ORDER — HYDROCODONE BITARTRATE AND ACETAMINOPHEN 5; 325 MG/1; MG/1
1 TABLET ORAL EVERY 8 HOURS PRN
Qty: 5 TABLET | Refills: 0 | Status: SHIPPED | OUTPATIENT
Start: 2020-11-13 | End: 2021-07-13

## 2020-11-13 RX ORDER — ERYTHROMYCIN 5 MG/G
OINTMENT OPHTHALMIC
Qty: 1 TUBE | Refills: 0 | Status: SHIPPED | OUTPATIENT
Start: 2020-11-13 | End: 2020-11-13 | Stop reason: SDUPTHER

## 2020-11-13 RX ORDER — KETOROLAC TROMETHAMINE 30 MG/ML
15 INJECTION, SOLUTION INTRAMUSCULAR; INTRAVENOUS
Status: DISCONTINUED | OUTPATIENT
Start: 2020-11-13 | End: 2020-11-13 | Stop reason: HOSPADM

## 2020-11-13 RX ORDER — MELOXICAM 7.5 MG/1
7.5 TABLET ORAL DAILY
Qty: 7 TABLET | Refills: 0 | Status: SHIPPED | OUTPATIENT
Start: 2020-11-13 | End: 2020-11-20

## 2020-11-13 RX ORDER — ERYTHROMYCIN 5 MG/G
OINTMENT OPHTHALMIC
Status: COMPLETED | OUTPATIENT
Start: 2020-11-13 | End: 2020-11-13

## 2020-11-13 RX ORDER — ERYTHROMYCIN 5 MG/G
OINTMENT OPHTHALMIC EVERY 6 HOURS
Qty: 1 TUBE | Refills: 1 | Status: SHIPPED | OUTPATIENT
Start: 2020-11-13 | End: 2020-11-23

## 2020-11-13 RX ADMIN — TETRACAINE HYDROCHLORIDE 2 DROP: 5 SOLUTION OPHTHALMIC at 10:11

## 2020-11-13 RX ADMIN — FLUORESCEIN SODIUM 1 EACH: 1 STRIP OPHTHALMIC at 10:11

## 2020-11-13 RX ADMIN — ERYTHROMYCIN 1 INCH: 5 OINTMENT OPHTHALMIC at 10:11

## 2020-11-13 NOTE — ED PROVIDER NOTES
Encounter Date: 11/13/2020       History     Chief Complaint   Patient presents with    Eye Injury     pt reports she accidentally poked self in right eye with her finger, reports photophobia     25-year-old female presents to the ED complaining of right eye pain that began last night after she accidentally poked herself in the eye.  Patient states that since then, her eye is watery, painful, pain is worse with bright lights.  She has mild blurriness of her vision, but she attributes this to her eye constantly watering.  She denies any purulent discharge of her eye, headache, fever, loss of vision.  Patient does not wear contacts.    The history is provided by the patient.   Eye Injury  This is a new problem. The current episode started 12 to 24 hours ago. The problem occurs constantly. The problem has not changed since onset.Pertinent negatives include no chest pain, no abdominal pain, no headaches and no shortness of breath. Nothing aggravates the symptoms. Nothing relieves the symptoms. She has tried nothing for the symptoms. The treatment provided no relief.     Review of patient's allergies indicates:  No Known Allergies  Past Medical History:   Diagnosis Date    Asthma      Past Surgical History:   Procedure Laterality Date    OPEN REDUCTION AND INTERNAL FIXATION (ORIF) OF FRACTURE OF METATARSAL BONE Left 2/21/2020    Procedure: ORIF, FRACTURE, METATARSAL BONE  [place] 28 screws and baby gorilla;  Surgeon: Tim Nobles DPM;  Location: [facility];  Service: Podiatry;  Laterality: Left;     Family History   Problem Relation Age of Onset    Mental illness Mother     Diabetes Father     Cancer Maternal Aunt     Diabetes Maternal Grandmother     Diabetes Paternal Grandmother      Social History     Tobacco Use    Smoking status: Never Smoker    Smokeless tobacco: Never Used   Substance Use Topics    Alcohol use: Never     Frequency: Never    Drug use: Not on file     Review of Systems    Constitutional: Negative for chills, diaphoresis and fever.   HENT: Negative for nosebleeds and sore throat.    Eyes: Positive for photophobia (pain is worse with bright lights), pain, discharge (clear, watery) and redness. Negative for itching and visual disturbance.   Respiratory: Negative for cough and shortness of breath.    Cardiovascular: Negative for chest pain and leg swelling.   Gastrointestinal: Negative for abdominal pain and vomiting.   Genitourinary: Negative for dysuria and flank pain.   Musculoskeletal: Negative for back pain, gait problem and joint swelling.   Skin: Negative for color change, rash and wound.   Neurological: Negative for dizziness, syncope, weakness, light-headedness and headaches.   Hematological: Does not bruise/bleed easily.   All other systems reviewed and are negative.      Physical Exam     Initial Vitals   BP Pulse Resp Temp SpO2   11/13/20 0948 11/13/20 0948 11/13/20 0948 11/13/20 0949 11/13/20 0948   117/79 88 18 98.6 °F (37 °C) 100 %      MAP       --                Physical Exam    Nursing note and vitals reviewed.  Constitutional: She appears well-developed and well-nourished. She is not diaphoretic. No distress.   HENT:   Head: Normocephalic and atraumatic.   Right Ear: External ear normal.   Left Ear: External ear normal.   Mouth/Throat: Oropharynx is clear and moist.   Eyes: EOM and lids are normal. Pupils are equal, round, and reactive to light. Lids are everted and swept, no foreign bodies found. Right eye exhibits chemosis (very mild) and discharge (clear watery). Right eye exhibits no exudate. No foreign body present in the right eye. Left eye exhibits no chemosis, no discharge and no exudate. No foreign body present in the left eye. Right conjunctiva is injected. Right conjunctiva has no hemorrhage. Left conjunctiva is not injected. Left conjunctiva has no hemorrhage. Right eye exhibits normal extraocular motion and no nystagmus. Left eye exhibits normal  extraocular motion and no nystagmus.   Slit lamp exam:       The right eye shows corneal abrasion and fluorescein uptake.        The left eye shows no corneal abrasion and no fluorescein uptake.       Patient has a well-demarcated corneal abrasion in the 7 o'clock position over the iris approximately 2 mm in diameter.   Neck: Normal range of motion. Neck supple.   Cardiovascular: Normal rate, regular rhythm and intact distal pulses.   Pulmonary/Chest: No tachypnea. No respiratory distress.   Abdominal: Soft. There is no abdominal tenderness.   Musculoskeletal: Normal range of motion. No tenderness or edema.   Neurological: She is alert and oriented to person, place, and time. She has normal strength.   Skin: Skin is warm and dry.   Psychiatric: She has a normal mood and affect.         ED Course   Procedures  Labs Reviewed   PREGNANCY TEST, URINE RAPID    Narrative:     Specimen Source->Urine          Imaging Results    None          Medical Decision Making:   Differential Diagnosis:   Corneal abrasion, corneal foreign body, open globe, glaucoma, orbital cellulitis, conjunctivitis  ED Management:  25-year-old female presents to the ED, found to have a corneal abrasion of her right eye.  The patient does not have concerning findings, has good vision on visual acuity.  UPT is negative.  Patient feels much better after tetracaine application by me.  Erythromycin ointment applied in the ED and I have also prescribed for her.  I have instructed her to see an eye doctor, and I have also referred her to Ophthalmology for recheck.  Patient does not have any infectious signs, no sign of conjunctivitis or cellulitis.  I will give the patient a very short course of Norco and anti-inflammatories for her pain.     I have discussed discharge instructions and strict return precautions.  The patient/family/caregiver verbalizes understanding to return to the ED immediately for any new/worsening symptoms, to follow up with  PCP/specialist recommended in 1-2 days and they are in agreement with the treatment/discharge plan                               Clinical Impression:     ICD-10-CM ICD-9-CM   1. Abrasion of right cornea, initial encounter  S05.01XA 918.1                          ED Disposition Condition    Discharge Stable        ED Prescriptions     Medication Sig Dispense Start Date End Date Auth. Provider    erythromycin (ROMYCIN) ophthalmic ointment  (Status: Discontinued) Place a 1/2 inch ribbon of ointment into the lower eyelid. 1 Tube 11/13/2020 11/13/2020 Alana Harris MD    HYDROcodone-acetaminophen (NORCO) 5-325 mg per tablet Take 1 tablet by mouth every 8 (eight) hours as needed for Pain. Will cause drowsiness.  Do not drive within 12 hrs of taking 5 tablet 11/13/2020  Alana Harris MD    meloxicam (MOBIC) 7.5 MG tablet Take 1 tablet (7.5 mg total) by mouth once daily. for 7 days 7 tablet 11/13/2020 11/20/2020 Alana Harris MD    erythromycin (ROMYCIN) ophthalmic ointment  (Status: Discontinued) Place a 1/2 inch ribbon of ointment into the lower eyelid. 1 Tube 11/13/2020 11/13/2020 Alana Harris MD    erythromycin (ROMYCIN) ophthalmic ointment Place into the right eye every 6 (six) hours. Place a 1/2 inch ribbon of ointment into the lower eyelid. for 10 days 1 Tube 11/13/2020 11/23/2020 Alana Harris MD        Follow-up Information     Follow up With Specialties Details Why Contact Info    Maria C Roman MD Ophthalmology Schedule an appointment as soon as possible for a visit in 3 days  [address] [address]  [phone]      Pedro Turner MD Optometry Schedule an appointment as soon as possible for a visit in 3 days  [address] [facility] [address]  [place] [facility] [place] [address] [phone]                                         Alana Harris MD  11/14/20 0534

## 2021-07-12 ENCOUNTER — HOSPITAL ENCOUNTER (EMERGENCY)
Facility: HOSPITAL | Age: 26
Discharge: HOME | End: 2021-07-12
Attending: FAMILY MEDICINE
Payer: COMMERCIAL

## 2021-07-12 VITALS
HEIGHT: 64 IN | TEMPERATURE: 99 F | SYSTOLIC BLOOD PRESSURE: 129 MMHG | HEART RATE: 71 BPM | WEIGHT: 115 LBS | DIASTOLIC BLOOD PRESSURE: 94 MMHG | RESPIRATION RATE: 16 BRPM | OXYGEN SATURATION: 98 % | BODY MASS INDEX: 19.63 KG/M2

## 2021-07-12 DIAGNOSIS — R51.9 ACUTE NONINTRACTABLE HEADACHE, UNSPECIFIED HEADACHE TYPE: Primary | ICD-10-CM

## 2021-07-12 DIAGNOSIS — R51.9 NONINTRACTABLE HEADACHE, UNSPECIFIED CHRONICITY PATTERN, UNSPECIFIED HEADACHE TYPE: ICD-10-CM

## 2021-07-12 PROCEDURE — 96372 THER/PROPH/DIAG INJ SC/IM: CPT

## 2021-07-12 PROCEDURE — 63600175 PHARM REV CODE 636 W HCPCS: Performed by: FAMILY MEDICINE

## 2021-07-12 PROCEDURE — 99284 EMERGENCY DEPT VISIT MOD MDM: CPT | Mod: 25

## 2021-07-12 PROCEDURE — 25000003 PHARM REV CODE 250: Performed by: FAMILY MEDICINE

## 2021-07-12 RX ORDER — IBUPROFEN 600 MG/1
600 TABLET ORAL
Status: COMPLETED | OUTPATIENT
Start: 2021-07-12 | End: 2021-07-12

## 2021-07-12 RX ORDER — DEXAMETHASONE SODIUM PHOSPHATE 10 MG/ML
10 INJECTION INTRAMUSCULAR; INTRAVENOUS
Status: COMPLETED | OUTPATIENT
Start: 2021-07-12 | End: 2021-07-12

## 2021-07-12 RX ORDER — HYDROCODONE BITARTRATE AND ACETAMINOPHEN 5; 325 MG/1; MG/1
1 TABLET ORAL
Status: COMPLETED | OUTPATIENT
Start: 2021-07-12 | End: 2021-07-12

## 2021-07-12 RX ORDER — PROMETHAZINE HYDROCHLORIDE 25 MG/ML
25 INJECTION, SOLUTION INTRAMUSCULAR; INTRAVENOUS
Status: COMPLETED | OUTPATIENT
Start: 2021-07-12 | End: 2021-07-12

## 2021-07-12 RX ADMIN — DEXAMETHASONE SODIUM PHOSPHATE 10 MG: 10 INJECTION INTRAMUSCULAR; INTRAVENOUS at 05:07

## 2021-07-12 RX ADMIN — HYDROCODONE BITARTRATE AND ACETAMINOPHEN 1 TABLET: 5; 325 TABLET ORAL at 05:07

## 2021-07-12 RX ADMIN — PROMETHAZINE HYDROCHLORIDE 25 MG: 25 INJECTION INTRAMUSCULAR; INTRAVENOUS at 05:07

## 2021-07-12 RX ADMIN — IBUPROFEN 600 MG: 600 TABLET, FILM COATED ORAL at 05:07

## 2021-07-13 ENCOUNTER — OFFICE VISIT (OUTPATIENT)
Dept: FAMILY MEDICINE | Facility: CLINIC | Age: 26
End: 2021-07-13
Payer: COMMERCIAL

## 2021-07-13 VITALS
DIASTOLIC BLOOD PRESSURE: 81 MMHG | HEART RATE: 65 BPM | RESPIRATION RATE: 16 BRPM | BODY MASS INDEX: 22.26 KG/M2 | OXYGEN SATURATION: 98 % | WEIGHT: 130.38 LBS | HEIGHT: 64 IN | SYSTOLIC BLOOD PRESSURE: 111 MMHG

## 2021-07-13 DIAGNOSIS — G43.709 CHRONIC MIGRAINE WITHOUT AURA WITHOUT STATUS MIGRAINOSUS, NOT INTRACTABLE: Primary | ICD-10-CM

## 2021-07-13 PROCEDURE — 3008F BODY MASS INDEX DOCD: CPT | Mod: CPTII,S$GLB,, | Performed by: FAMILY MEDICINE

## 2021-07-13 PROCEDURE — 1159F MED LIST DOCD IN RCRD: CPT | Mod: CPTII,S$GLB,, | Performed by: FAMILY MEDICINE

## 2021-07-13 PROCEDURE — 96372 THER/PROPH/DIAG INJ SC/IM: CPT | Mod: S$GLB,,, | Performed by: FAMILY MEDICINE

## 2021-07-13 PROCEDURE — 1160F RVW MEDS BY RX/DR IN RCRD: CPT | Mod: CPTII,S$GLB,, | Performed by: FAMILY MEDICINE

## 2021-07-13 PROCEDURE — 3079F PR MOST RECENT DIASTOLIC BLOOD PRESSURE 80-89 MM HG: ICD-10-PCS | Mod: CPTII,S$GLB,, | Performed by: FAMILY MEDICINE

## 2021-07-13 PROCEDURE — 3008F PR BODY MASS INDEX (BMI) DOCUMENTED: ICD-10-PCS | Mod: CPTII,S$GLB,, | Performed by: FAMILY MEDICINE

## 2021-07-13 PROCEDURE — 99999 PR PBB SHADOW E&M-EST. PATIENT-LVL III: ICD-10-PCS | Mod: PBBFAC,,, | Performed by: FAMILY MEDICINE

## 2021-07-13 PROCEDURE — 1160F PR REVIEW ALL MEDS BY PRESCRIBER/CLIN PHARMACIST DOCUMENTED: ICD-10-PCS | Mod: CPTII,S$GLB,, | Performed by: FAMILY MEDICINE

## 2021-07-13 PROCEDURE — 96372 PR INJECTION,THERAP/PROPH/DIAG2ST, IM OR SUBCUT: ICD-10-PCS | Mod: S$GLB,,, | Performed by: FAMILY MEDICINE

## 2021-07-13 PROCEDURE — 99204 OFFICE O/P NEW MOD 45 MIN: CPT | Mod: 25,S$GLB,, | Performed by: FAMILY MEDICINE

## 2021-07-13 PROCEDURE — 3074F SYST BP LT 130 MM HG: CPT | Mod: CPTII,S$GLB,, | Performed by: FAMILY MEDICINE

## 2021-07-13 PROCEDURE — 1159F PR MEDICATION LIST DOCUMENTED IN MEDICAL RECORD: ICD-10-PCS | Mod: CPTII,S$GLB,, | Performed by: FAMILY MEDICINE

## 2021-07-13 PROCEDURE — 99204 PR OFFICE/OUTPT VISIT, NEW, LEVL IV, 45-59 MIN: ICD-10-PCS | Mod: 25,S$GLB,, | Performed by: FAMILY MEDICINE

## 2021-07-13 PROCEDURE — 99999 PR PBB SHADOW E&M-EST. PATIENT-LVL III: CPT | Mod: PBBFAC,,, | Performed by: FAMILY MEDICINE

## 2021-07-13 PROCEDURE — 3074F PR MOST RECENT SYSTOLIC BLOOD PRESSURE < 130 MM HG: ICD-10-PCS | Mod: CPTII,S$GLB,, | Performed by: FAMILY MEDICINE

## 2021-07-13 PROCEDURE — 3079F DIAST BP 80-89 MM HG: CPT | Mod: CPTII,S$GLB,, | Performed by: FAMILY MEDICINE

## 2021-07-13 RX ORDER — SUMATRIPTAN 50 MG/1
50 TABLET, FILM COATED ORAL
Qty: 30 TABLET | Refills: 2 | Status: SHIPPED | OUTPATIENT
Start: 2021-07-13 | End: 2021-08-12

## 2021-07-13 RX ORDER — KETOROLAC TROMETHAMINE 30 MG/ML
30 INJECTION, SOLUTION INTRAMUSCULAR; INTRAVENOUS
Status: COMPLETED | OUTPATIENT
Start: 2021-07-13 | End: 2021-07-13

## 2021-07-13 RX ADMIN — KETOROLAC TROMETHAMINE 30 MG: 30 INJECTION, SOLUTION INTRAMUSCULAR; INTRAVENOUS at 02:07

## 2021-07-14 ENCOUNTER — TELEPHONE (OUTPATIENT)
Dept: FAMILY MEDICINE | Facility: CLINIC | Age: 26
End: 2021-07-14

## 2022-07-29 ENCOUNTER — TELEPHONE (OUTPATIENT)
Dept: FAMILY MEDICINE | Facility: CLINIC | Age: 27
End: 2022-07-29

## 2022-07-29 NOTE — TELEPHONE ENCOUNTER
----- Message from Jeanine Wright sent at 7/29/2022  9:26 AM CDT -----  Contact: [phone]  Type: Needs Medical Advice  Who Called: Pt    Best Call Back Number: [phone]    Additional Information: Pt calling to get a letter stating she has migraines. Pt was seen 7/2021. Pls call back and advise.

## 2022-08-14 NOTE — ANESTHESIA POSTPROCEDURE EVALUATION
Anesthesia Post Evaluation    Patient: Yaritza Lopez    Procedure(s) Performed: Procedure(s) (LRB):  ORIF, FRACTURE, METATARSAL BONE  [place] 28 screws and baby gorilla (Left)    Final Anesthesia Type: general    Patient location during evaluation: PACU  Patient participation: Yes- Able to Participate  Level of consciousness: awake and awake and alert  Post-procedure vital signs: reviewed and stable  Pain management: adequate  Airway patency: patent    PONV status at discharge: No PONV  Anesthetic complications: no      Cardiovascular status: blood pressure returned to baseline  Respiratory status: unassisted and spontaneous ventilation  Hydration status: euvolemic  Follow-up not needed.          Vitals Value Taken Time   /73 2/21/2020 10:57 AM   Temp 36.7 °C (98 °F) 2/21/2020 10:40 AM   Pulse 75 2/21/2020 11:20 AM   Resp 21 2/21/2020 11:20 AM   SpO2 98 % 2/21/2020 11:20 AM         Event Time     Out of Recovery 11:12:47          Pain/Maureen Score: Maureen Score: 10 (2/21/2020 11:10 AM)  Modified Maureen Score: 19 (2/21/2020 11:25 AM)         Pt c/o mid epigastric abdominal pain x 7 ays, worse today.

## 2023-02-09 ENCOUNTER — OUTSIDE PLACE OF SERVICE (OUTPATIENT)
Dept: OBSTETRICS AND GYNECOLOGY | Facility: CLINIC | Age: 28
End: 2023-02-09

## 2023-02-09 PROCEDURE — 99213 OFFICE O/P EST LOW 20 MIN: CPT | Mod: ,,, | Performed by: OBSTETRICS & GYNECOLOGY

## 2023-02-09 PROCEDURE — 99213 PR OFFICE/OUTPT VISIT, EST, LEVL III, 20-29 MIN: ICD-10-PCS | Mod: ,,, | Performed by: OBSTETRICS & GYNECOLOGY

## 2025-01-16 ENCOUNTER — TELEPHONE (OUTPATIENT)
Dept: PODIATRY | Facility: CLINIC | Age: 30
End: 2025-01-16

## 2025-01-16 NOTE — TELEPHONE ENCOUNTER
----- Message from Valerie sent at 1/16/2025 11:37 AM CST -----  Type:  Needs Medical Advice    Who Called: pt    Would the patient rather a call back or a response via [facility]? Please call pt    Best Call Back Number: [phone]    Additional Information: Pt would like paperwork regarding surgery she had, and the deadline is tomorrow 01/17/2025   Please call back to advise. Thanks!

## 2025-01-16 NOTE — TELEPHONE ENCOUNTER
Patient requesting a letter to take with her to the airport for when she has to go through security due to plates and screws in her foot. Please advise

## 2025-05-21 ENCOUNTER — TELEPHONE (OUTPATIENT)
Dept: PODIATRY | Facility: CLINIC | Age: 30
End: 2025-05-21
Payer: OTHER GOVERNMENT

## 2025-05-21 NOTE — TELEPHONE ENCOUNTER
----- Message from Tabitha sent at 5/21/2025  3:14 PM CDT -----  Contact: self  Type:  Needs Medical AdviceWho Called: selfSymptoms (please be specific): pt sts she had to cancel her appt for today would like to be seen sooner than 6/2, pt dislocated toe Would the patient rather a call back or a response via [facility]? [facility] Call Back Number: [phone] (home) Additional Information: please advise and thank you   Left vmom for pt to c/b. C/B # and OH provided.

## 2025-05-22 ENCOUNTER — TELEPHONE (OUTPATIENT)
Dept: PODIATRY | Facility: CLINIC | Age: 30
End: 2025-05-22
Payer: OTHER GOVERNMENT

## 2025-05-22 ENCOUNTER — HOSPITAL ENCOUNTER (OUTPATIENT)
Dept: RADIOLOGY | Facility: HOSPITAL | Age: 30
Discharge: HOME | End: 2025-05-22
Attending: PODIATRIST
Payer: OTHER GOVERNMENT

## 2025-05-22 ENCOUNTER — OFFICE VISIT (OUTPATIENT)
Dept: PODIATRY | Facility: CLINIC | Age: 30
End: 2025-05-22
Payer: OTHER GOVERNMENT

## 2025-05-22 VITALS
HEART RATE: 80 BPM | BODY MASS INDEX: 22.25 KG/M2 | HEIGHT: 64 IN | SYSTOLIC BLOOD PRESSURE: 116 MMHG | DIASTOLIC BLOOD PRESSURE: 72 MMHG | WEIGHT: 130.31 LBS

## 2025-05-22 DIAGNOSIS — S92.512A CLOSED DISPLACED FRACTURE OF PROXIMAL PHALANX OF LESSER TOE OF LEFT FOOT, INITIAL ENCOUNTER: ICD-10-CM

## 2025-05-22 DIAGNOSIS — S99.922A INJURY OF TOE ON LEFT FOOT, INITIAL ENCOUNTER: ICD-10-CM

## 2025-05-22 DIAGNOSIS — S99.922A INJURY OF TOE ON LEFT FOOT, INITIAL ENCOUNTER: Primary | ICD-10-CM

## 2025-05-22 PROCEDURE — 99999 PR PBB SHADOW E&M-EST. PATIENT-LVL III: CPT | Mod: PBBFAC,,, | Performed by: PODIATRIST

## 2025-05-22 PROCEDURE — 73630 X-RAY EXAM OF FOOT: CPT | Mod: 26,LT,, | Performed by: RADIOLOGY

## 2025-05-22 PROCEDURE — 73630 X-RAY EXAM OF FOOT: CPT | Mod: TC,PN,LT

## 2025-05-22 PROCEDURE — 99204 OFFICE O/P NEW MOD 45 MIN: CPT | Mod: S$GLB,,, | Performed by: PODIATRIST

## 2025-05-22 NOTE — TELEPHONE ENCOUNTER
----- Message from Tim Nobles DPM sent at 5/22/2025  9:39 AM CDT -----  Please call the patient and advise her she does have a fracture of the 4th and 5th digit on the left foot the 5th digit is a very mild oblique type fracture the 4th digit has a fracture at the neck of the proximal phalanx in the 4th toe advise her this should heal without any problem however it can stay sore and swollen for several months she needs to wear the fracture boot for at least 2 weeks after that time a loose fitting shoe that is comfortable is okay but I want make sure she understands it will take 4-6 weeks for the bone to be healed completely.  Please advised the patient if she is not showing signs of improvement or starting to feel better in 3-4 weeks to contact us for follow-up.  It is not unusual for patients to have some residual swelling for several months even after the toe is healed.  Thank you

## 2025-05-25 PROBLEM — S92.512A CLOSED DISPLACED FRACTURE OF PROXIMAL PHALANX OF LESSER TOE OF LEFT FOOT: Status: ACTIVE | Noted: 2025-05-25

## 2025-05-26 ENCOUNTER — TELEPHONE (OUTPATIENT)
Dept: PODIATRY | Facility: CLINIC | Age: 30
End: 2025-05-26
Payer: OTHER GOVERNMENT

## 2025-05-26 NOTE — TELEPHONE ENCOUNTER
----- Message from Tim Nobles DPM sent at 5/25/2025  8:54 PM CDT -----  I would like to see this patient for a follow-up in about 3 weeks in the [place] office so that we can take another x-ray just to ensure that the area is healing properly and I am hopeful at that time she will be pain-free and that is some of the swelling and inflammation he will be down so the 4th digit can go back to a normal position.  Three-week follow-up for the patient please advise her if for any reason her condition worsens this gets more painful more problematic she is to contact us immediately but she should be wearing the fracture boot at all times.

## 2025-05-26 NOTE — PROGRESS NOTES
Subjective:       Patient ID: Yaritza Lopez is a 29 y.o. female.    Chief Complaint: Foot Pain  Patient presents today for a new patient evaluation it has been well over 3 years since I last saw the patient.  I had previously treated the patient surgically for a fracture displaced 5th metatarsal left the patient states this area has been absolutely fine however on Tuesday of this week she accidentally walked into a large picture that was leaning against the wall hitting the 4th and 5th digits on her left foot.  Patient states she went to [facility] Emergency [facility] she was told there were no fractures no dislocation however the patient states she still got a good bit of pain and discomfort and she feels like something has to be wrong that it hurts as much as it does.    Past Medical History:   Diagnosis Date    Asthma      Past Surgical History:   Procedure Laterality Date    OPEN REDUCTION AND INTERNAL FIXATION (ORIF) OF FRACTURE OF METATARSAL BONE Left 2/21/2020    Procedure: ORIF, FRACTURE, METATARSAL BONE  [place] 28 screws and baby gorilla;  Surgeon: Tim Nobles DPM;  Location: [facility] OR;  Service: Podiatry;  Laterality: Left;     Family History   Problem Relation Name Age of Onset    Mental illness Mother      Migraines Mother      Diabetes Father      Migraines Father      Cancer Maternal Aunt      Diabetes Maternal Grandmother      Diabetes Paternal Grandmother      Migraines Sister       Social History     Socioeconomic History    Marital status: Single   Tobacco Use    Smoking status: Never    Smokeless tobacco: Never   Substance and Sexual Activity    Alcohol use: Never    Sexual activity: Yes     Partners: Male     Birth control/protection: None     Social Drivers of Health     Financial Resource Strain: Patient Declined (2/9/2023)    Received from [facility]    Overall Financial Resource Strain (CARDIA)     Difficulty of Paying Living Expenses: Patient declined  "  Food Insecurity: Patient Declined (2/9/2023)    Received from [facility]    Hunger Vital Sign     Worried About Running Out of Food in the Last Year: Patient declined     Ran Out of Food in the Last Year: Patient declined   Transportation Needs: Patient Declined (2/9/2023)    Received from [facility]    PRAPARE - Transportation     Lack of Transportation (Medical): Patient declined     Lack of Transportation (Non-Medical): Patient declined   Physical Activity: Patient Declined (2/9/2023)    Received from [facility]    Exercise Vital Sign     Days of Exercise per Week: Patient declined     Minutes of Exercise per Session: Patient declined   Stress: Patient Declined (2/9/2023)    Received from [facility]    [employer] [place] [employer] - Occupational Stress Questionnaire     Feeling of Stress : Patient declined   Housing Stability: Unknown (2/9/2023)    Received from [facility]    Housing Stability Vital Sign     Unable to Pay for Housing in the Last Year: Patient refused     Number of Places Lived in the Last Year: 1     Unstable Housing in the Last Year: Patient refused       Current Medications[1]  Review of patient's allergies indicates:  No Known Allergies    Review of Systems   Musculoskeletal:  Positive for arthralgias and joint swelling.   Skin:  Positive for color change.       Objective:      Vitals:    05/22/25 0813   BP: 116/72   Pulse: 80   Weight: 59.1 kg (130 lb 4.7 oz)   Height: 5' 4" (1.626 m)     Physical Exam  Vitals and nursing note reviewed.   Constitutional:       Appearance: Normal appearance.   Cardiovascular:      Pulses:           Dorsalis pedis pulses are 2+ on the right side and 2+ on the left side.        Posterior tibial pulses are 2+ on the right side and 2+ on the left side.   Pulmonary:      Effort: Pulmonary effort is normal.   Musculoskeletal:         General: Swelling, tenderness and " deformity present.      Left foot: Deformity present.        Feet:    Feet:      Right foot:      Protective Sensation: 2 sites tested.  2 sites sensed.      Skin integrity: Skin integrity normal.      Left foot:      Protective Sensation: 2 sites tested.  2 sites sensed.      Skin integrity: Erythema and warmth present.   Skin:     Capillary Refill: Capillary refill takes less than 2 seconds.      Findings: Erythema present.   Neurological:      General: No focal deficit present.      Mental Status: She is alert.   Psychiatric:         Mood and Affect: Mood normal.         Behavior: Behavior normal.                        Assessment:       1. Injury of toe on left foot, initial encounter    2. Closed displaced fracture of proximal phalanx of lesser toe of left foot, initial encounter        Plan:       Patient presents today for a new patient evaluation it has been well over 3 years since I last saw the patient.  I had previously treated the patient surgically for a fracture displaced 5th metatarsal left the patient states this area has been absolutely fine however on Tuesday of this week she accidentally walked into a large picture that was leaning against the wall hitting the 4th and 5th digits on her left foot.  Patient states she went to [facility] Emergency Department she was told there were no fractures no dislocation however the patient states she still got a good bit of pain and discomfort and she feels like something has to be wrong that it hurts as much as it does.  A comprehensive new patient evaluation was performed today patient has some lateral deviation of the 4th digit left now this is likely related to the swelling in the digit from the trauma and I did advised the patient once the inflammation goes down it is very likely the toe will go back to its normal position.  Patient's primary focused area of discomfort is the 4th and 5th digits on the left foot I did advised the patient I am not able  to see the x-rays that were taking it [place] and because of the bruising the swelling of it least the 4th digit there has to be a fracture of the digit possibly even a rupture of the extensor tendon this area would not be that is sore simply jamming the toe unless there was an underlying fracture or soft tissue injury.  I am going to put the patient in a fracture boot as a precaution until she can get another x-ray done I put the x-ray order in she will be having the x-ray done later today and we will contact the patient once we have the x-ray results.  Patient indicated today to that after the injury the toe was completely cook it she actually pulled it herself as almost instantaneous reaction and it popped and she states it actually looked better after she did this.  Subsequent as Fercho's that were taken after the patient left the office visit do display signs of fracture at the neck of the proximal phalanx 4th digit there is also an area which may exhibit a nondisplaced oblique fracture of the 5th digit but the patient's primary in most obvious sign of fracture is the 4th digit left.  Patient was contacted advised to continue wearing the boot as directed she is going to need to wear that boot for at least a couple of weeks and I have advised her after 2-3 weeks in the boot more likely 3 weeks she can try transitioning out of the boot into a loose fitting shoe if she still having too much pain and discomfort she will have to remain in the boot for a total of 4 weeks and at that time she can likely transition out of the boot ultimately I have advised the patient I would like to see her for follow-up at about the 3 week isabella we can always take another x-ray re-evaluate the area.This note was created using [employer] voice recognition software that occasionally misinterpreted phrases or words.        [1]   No current outpatient medications on file.     No current facility-administered medications for this visit.

## 2025-05-26 NOTE — TELEPHONE ENCOUNTER
Discussed follow-up with patient. F/u scheduled for June 16th. Patient states that she has only been wearing the boot when she leaves the house. Advised patient to wear the boot at all times. Patient stated that she does have some discoloration on her foot that is very dark. Patient stated this could potentially be bruising. Patient will follow-up sooner if there are any concerns or changes. SHAYLA Bolaños 05/26/2025

## 2025-06-16 ENCOUNTER — HOSPITAL ENCOUNTER (OUTPATIENT)
Dept: RADIOLOGY | Facility: HOSPITAL | Age: 30
Discharge: HOME | End: 2025-06-16
Attending: PODIATRIST
Payer: OTHER GOVERNMENT

## 2025-06-16 ENCOUNTER — OFFICE VISIT (OUTPATIENT)
Dept: PODIATRY | Facility: CLINIC | Age: 30
End: 2025-06-16
Payer: OTHER GOVERNMENT

## 2025-06-16 VITALS
WEIGHT: 130.31 LBS | DIASTOLIC BLOOD PRESSURE: 72 MMHG | SYSTOLIC BLOOD PRESSURE: 118 MMHG | HEART RATE: 74 BPM | HEIGHT: 64 IN | BODY MASS INDEX: 22.25 KG/M2

## 2025-06-16 DIAGNOSIS — S92.512D CLOSED DISPLACED FRACTURE OF PROXIMAL PHALANX OF LESSER TOE OF LEFT FOOT WITH ROUTINE HEALING, SUBSEQUENT ENCOUNTER: ICD-10-CM

## 2025-06-16 DIAGNOSIS — S92.512D CLOSED DISPLACED FRACTURE OF PROXIMAL PHALANX OF LESSER TOE OF LEFT FOOT WITH ROUTINE HEALING, SUBSEQUENT ENCOUNTER: Primary | ICD-10-CM

## 2025-06-16 PROCEDURE — 99213 OFFICE O/P EST LOW 20 MIN: CPT | Mod: S$GLB,,, | Performed by: PODIATRIST

## 2025-06-16 PROCEDURE — 73630 X-RAY EXAM OF FOOT: CPT | Mod: 26,LT,, | Performed by: RADIOLOGY

## 2025-06-16 PROCEDURE — 99999 PR PBB SHADOW E&M-EST. PATIENT-LVL III: CPT | Mod: PBBFAC,,, | Performed by: PODIATRIST

## 2025-06-16 PROCEDURE — 73630 X-RAY EXAM OF FOOT: CPT | Mod: TC,LT

## 2025-06-17 NOTE — PROGRESS NOTES
Subjective:       Patient ID: Yaritza Lopez is a 29 y.o. female.    Chief Complaint: Toe Injury  Patient presents today for follow-up of a fractured 4th digit left.    Past Medical History:   Diagnosis Date    Asthma      Past Surgical History:   Procedure Laterality Date    OPEN REDUCTION AND INTERNAL FIXATION (ORIF) OF FRACTURE OF METATARSAL BONE Left 2/21/2020    Procedure: ORIF, FRACTURE, METATARSAL BONE  [place] 28 screws and baby gorilla;  Surgeon: Tim Nobles DPM;  Location: [facility];  Service: Podiatry;  Laterality: Left;     Family History   Problem Relation Name Age of Onset    Mental illness Mother      Migraines Mother      Diabetes Father      Migraines Father      Cancer Maternal Aunt      Diabetes Maternal Grandmother      Diabetes Paternal Grandmother      Migraines Sister       Social History     Socioeconomic History    Marital status: Single   Tobacco Use    Smoking status: Never    Smokeless tobacco: Never   Substance and Sexual Activity    Alcohol use: Never    Sexual activity: Yes     Partners: Male     Birth control/protection: None     Social Drivers of Health     Financial Resource Strain: Patient Declined (2/9/2023)    Received from [facility]    Overall Financial Resource Strain (CARDIA)     Difficulty of Paying Living Expenses: Patient declined   Food Insecurity: Patient Declined (2/9/2023)    Received from [facility]    Hunger Vital Sign     Worried About Running Out of Food in the Last Year: Patient declined     Ran Out of Food in the Last Year: Patient declined   Transportation Needs: Patient Declined (2/9/2023)    Received from [facility]    PRAPARE - Transportation     Lack of Transportation (Medical): Patient declined     Lack of Transportation (Non-Medical): Patient declined   Physical Activity: Patient Declined (2/9/2023)    Received from [facility]    Exercise Vital Sign     Days of Exercise per  "Week: Patient declined     Minutes of Exercise per Session: Patient declined   Stress: Patient Declined (2/9/2023)    Received from [facility]    [employer] - Occupational Stress Questionnaire     Feeling of Stress : Patient declined   Housing Stability: Unknown (2/9/2023)    Received from [facility]    Housing Stability Vital Sign     Unable to Pay for Housing in the Last Year: Patient refused     Number of Places Lived in the Last Year: 1     Unstable Housing in the Last Year: Patient refused       Current Medications[1]  Review of patient's allergies indicates:  No Known Allergies    Review of Systems   Musculoskeletal:  Positive for arthralgias and joint swelling.   Skin:  Positive for color change.       Objective:      Vitals:    06/16/25 0841   BP: 118/72   Pulse: 74   Weight: 59.1 kg (130 lb 4.7 oz)   Height: 5' 4" (1.626 m)     Physical Exam  Vitals and nursing note reviewed.   Constitutional:       Appearance: Normal appearance.   Cardiovascular:      Pulses:           Dorsalis pedis pulses are 2+ on the right side and 2+ on the left side.        Posterior tibial pulses are 2+ on the right side and 2+ on the left side.   Pulmonary:      Effort: Pulmonary effort is normal.   Musculoskeletal:         General: Swelling, tenderness and deformity present.      Left foot: Deformity present.        Feet:    Feet:      Right foot:      Protective Sensation: 2 sites tested.  2 sites sensed.      Skin integrity: Skin integrity normal.      Left foot:      Protective Sensation: 2 sites tested.  2 sites sensed.      Skin integrity: Erythema and warmth present.   Skin:     Capillary Refill: Capillary refill takes less than 2 seconds.      Findings: Erythema present.   Neurological:      General: No focal deficit present.      Mental Status: She is alert.   Psychiatric:         Mood and Affect: Mood normal.         Behavior: Behavior normal. "                              Assessment:       1. Closed displaced fracture of proximal phalanx of lesser toe of left foot with routine healing, subsequent encounter        Plan:       Patient presents today for follow-up of a fractured 4th digit left.  Patient states she is doing great she feels a lot better she is not having any pain or discomfort at this time.  Patient states she did wear the boot up until about a week ago she states it was feeling so much better she discontinued the boot.  X-rays were evaluated of the patient's left foot while the fracture lines can still be seen intra-articular at the neck of the proximal phalanx 4th digit left the area appears to be well healing at this time the patient definitely has decreased erythema and edema of the 4th digit left.  I did advised the patient it is a good sign that she is not having significant pain or discomfort I advised her clearly the areas healing well she needs to take it easy she needs to keep this area protected I am fine with her wearing normal shoes but she needs to make sure she does not cause any other trauma or injury to the area which could cause it to reef fracture.  Patient was in understanding and agreement with this patient will be seen as needed for follow-up.  This note was created using [employer] voice recognition software that occasionally misinterpreted phrases or words.            [1]   No current outpatient medications on file.     No current facility-administered medications for this visit.

## (undated) DEVICE — SPONGE DERMACEA GAUZE 4X4

## (undated) DEVICE — ELECTRODE REM PLYHSV RETURN 9

## (undated) DEVICE — DRAPE MINI C-ARM

## (undated) DEVICE — PAD PREPS ALCOHOL 2-PLY LARGE

## (undated) DEVICE — GOWN B1 X-LG X-LONG

## (undated) DEVICE — SOL 9P NACL IRR PIC IL

## (undated) DEVICE — MAT EVAC SURGISAFE 36 X 48

## (undated) DEVICE — GLOVE BIOGEL PI ORTHO PRO 7.5

## (undated) DEVICE — JELLY KY LUBRICATING 5G PACKET

## (undated) DEVICE — GAUZE SPONGE BULKEE 6X6.75IN

## (undated) DEVICE — SEE MEDLINE ITEM 146298

## (undated) DEVICE — COVER EQUIP 36X12 W/ELSTC BAND

## (undated) DEVICE — SUT MONOCRYL 4-0 PS-2

## (undated) DEVICE — SLEEVE SCD EXPRESS CALF MEDIUM

## (undated) DEVICE — ADHESIVE MASTISOL VIAL 48/BX

## (undated) DEVICE — PACK CUSTOM UNIV BASIN SLI

## (undated) DEVICE — BLADE #15 STERILE CARBON

## (undated) DEVICE — GLOVE SURGEONS ULTRA TOUCH 6.5

## (undated) DEVICE — SYR B-D DISP CONTROL 10CC100/C

## (undated) DEVICE — CLOSURE SKIN STERI STRIP 1/2X4

## (undated) DEVICE — GOWN SURGICAL XX LARGE X LONG

## (undated) DEVICE — UNDERGLOVE BIOGEL PI SZ 6.5 LF

## (undated) DEVICE — SEE MEDLINE ITEM 152522

## (undated) DEVICE — APPLICATOR CHLORAPREP ORN 26ML

## (undated) DEVICE — TOURNIQUET SB QC SP 18X4IN

## (undated) DEVICE — NDL SAFETY 25G X 1.5 ECLIPSE

## (undated) DEVICE — SUT 3-0 VICRYL / SH (J416)

## (undated) DEVICE — NDL ECLIPSE SAFETY 18GX1-1/2IN

## (undated) DEVICE — PAD ABD 8X10 STERILE

## (undated) DEVICE — SEE MEDLINE ITEM 146292

## (undated) DEVICE — PADDING CAST SPECIALIST 6X4YD